# Patient Record
Sex: MALE | Race: WHITE | NOT HISPANIC OR LATINO
[De-identification: names, ages, dates, MRNs, and addresses within clinical notes are randomized per-mention and may not be internally consistent; named-entity substitution may affect disease eponyms.]

---

## 2022-06-01 PROBLEM — Z00.00 ENCOUNTER FOR PREVENTIVE HEALTH EXAMINATION: Status: ACTIVE | Noted: 2022-06-01

## 2022-06-02 ENCOUNTER — APPOINTMENT (OUTPATIENT)
Dept: ORTHOPEDIC SURGERY | Facility: CLINIC | Age: 57
End: 2022-06-02
Payer: COMMERCIAL

## 2022-06-02 VITALS — WEIGHT: 200 LBS | HEIGHT: 74 IN | BODY MASS INDEX: 25.67 KG/M2

## 2022-06-02 DIAGNOSIS — Z86.39 PERSONAL HISTORY OF OTHER ENDOCRINE, NUTRITIONAL AND METABOLIC DISEASE: ICD-10-CM

## 2022-06-02 DIAGNOSIS — Z78.9 OTHER SPECIFIED HEALTH STATUS: ICD-10-CM

## 2022-06-02 PROCEDURE — 73030 X-RAY EXAM OF SHOULDER: CPT | Mod: LT

## 2022-06-02 PROCEDURE — 99204 OFFICE O/P NEW MOD 45 MIN: CPT

## 2022-06-02 RX ORDER — ATORVASTATIN CALCIUM 80 MG/1
TABLET, FILM COATED ORAL
Refills: 0 | Status: ACTIVE | COMMUNITY

## 2022-06-02 RX ORDER — METOPROLOL TARTRATE 75 MG/1
TABLET, FILM COATED ORAL
Refills: 0 | Status: ACTIVE | COMMUNITY

## 2022-06-02 NOTE — DATA REVIEWED
[MRI] : MRI [Outside X-rays] : outside x-rays [Left] : left [Shoulder] : shoulder [Report was reviewed and noted in the chart] : The report was reviewed and noted in the chart [FreeTextEntry1] : IMPRESSION:Severe degenerative changes at the glenohumeral joint as detailed above.tendinosis of the proximal long head of the biceps tendon.

## 2022-06-02 NOTE — ASSESSMENT
[FreeTextEntry1] : Options were discussed. Plan is for patient to get a CT scan or the left shoulder and f/u with Dr. Ingram for further evaluation of the bone depredation in the left GH joint.\par \par Entered by Yrn Hernandez acting as scribe.\par \par Dr. Hutchinson Attestation\par The documentation recorded by the scribe, in my presence, accurately reflects the service I, Dr. Hutchinson, personally performed, and the decisions made by me with my edits as appropriate.\par

## 2022-06-02 NOTE — HISTORY OF PRESENT ILLNESS
[Radiating] : radiating [Sharp] : sharp [Intermittent] : intermittent [de-identified] : LT shoulder pain for 10+ years. NKI. would like to discuss options. Had MRI at  2020 ordered PCP.  Has tried PT on and off for years. Pain has been about the same sometimes its sharp and runs down his bicep, and other times he doesn’t have pain.  rhd. no hx injections. pain in back of shoulder. no waking. pain 2/10 at rest. with use it varies. no meds. [] : no [FreeTextEntry1] : LT shoulder [FreeTextEntry7] : down to bicep

## 2022-06-02 NOTE — PHYSICAL EXAM
[Left] : left shoulder [Degenerative change] : Degenerative change [] : negative Schuler [FreeTextEntry9] : ER 45 B/L, IR Rt. T12 [FreeTextEntry1] : Severe DJD at  joint [TWNoteComboBox7] : active forward flexion 100 degrees [TWNoteComboBox4] : passive forward flexion 85 degrees [TWNoteComboBox6] : internal rotation L3 [de-identified] : external rotation 45 degrees

## 2022-06-08 ENCOUNTER — APPOINTMENT (OUTPATIENT)
Dept: PHYSICAL MEDICINE AND REHAB | Facility: CLINIC | Age: 57
End: 2022-06-08
Payer: COMMERCIAL

## 2022-06-08 VITALS — BODY MASS INDEX: 25.93 KG/M2 | HEIGHT: 74 IN | OXYGEN SATURATION: 97 % | HEART RATE: 90 BPM | WEIGHT: 202 LBS

## 2022-06-08 DIAGNOSIS — I25.10 ATHEROSCLEROTIC HEART DISEASE OF NATIVE CORONARY ARTERY W/OUT ANGINA PECTORIS: ICD-10-CM

## 2022-06-08 DIAGNOSIS — I10 ESSENTIAL (PRIMARY) HYPERTENSION: ICD-10-CM

## 2022-06-08 DIAGNOSIS — Z87.438 PERSONAL HISTORY OF OTHER DISEASES OF MALE GENITAL ORGANS: ICD-10-CM

## 2022-06-08 PROCEDURE — 99205 OFFICE O/P NEW HI 60 MIN: CPT

## 2022-06-08 RX ORDER — DICLOFENAC SODIUM AND MISOPROSTOL 75; 200 MG/1; UG/1
75-0.2 TABLET, DELAYED RELEASE ORAL TWICE DAILY
Qty: 60 | Refills: 0 | Status: ACTIVE | COMMUNITY
Start: 2022-06-08 | End: 1900-01-01

## 2022-06-08 NOTE — PROCEDURE
[de-identified] : Cortisone Injection left subacromial bursa \par \par Sterile Technique Injection \par 1 Syringe of 4cc 1 % Lidocaine HCL, 1cc Dexamethasone \par \par Left subacromial bursa \par \par Ice injection site PRN \par Injection tolerated well.\par \par \par

## 2022-06-08 NOTE — PHYSICAL EXAM
[FreeTextEntry1] : EXAMINATION OF THE LEFT SHOULDER: \par \par Upon inspection,  no deformity\par On palpation, tendernes involving greater trochenteric region. \par Range of motion testing was performed with the use of a goniometer. \par On range of motion testing, active flexion was to 110º\par Active abduction was to 90º\par Active external rotation was to 45º\par Active internal rotation was to 40º\par Active extension was to 20º\par Drop Arm Test was -.  Anterior and Posterior Apprehension Testing was -.  \par Painful arc:  degrees\par There was a + Impingement Sign.  Supraspinatus Test was +.  Yerganson's: -\par MMT: Flexion 5-/5, Abduction 5-/5, all other planes 5/5.\par \par \par \par

## 2022-06-08 NOTE — HISTORY OF PRESENT ILLNESS
[FreeTextEntry1] : Pt is a 56 year old male with complaints of left shoulder pain. Pt reports that he has been experiencing bouts of intermittent pain involving his left shoulder for approximately 10 years.  Over the course of time he has received physical therapy which has provided him some degree of relief in terms of improved range of motion.  He takes Motrin as needed.  He reports experiencing an exacerbation left shoulder pain approximately 2 weeks ago.  Unaware of any precipitating event.  Has been taking Motrin as needed with some relief.  He has been evaluated with  (orthopedist) x-ray was obtained which demonstrated severe DJD of the joint.  He is pending an MRI of his left shoulder.  Presents today in my office for evaluation and treatment of left shoulder pain.

## 2022-06-08 NOTE — ASSESSMENT
[FreeTextEntry1] : Diclofenac po qd BID with meal #60\par Follow-up with ortho \par Instruction in HEP\par Recheck in 4-6 weeks if symptoms persist \par

## 2022-06-09 ENCOUNTER — RESULT REVIEW (OUTPATIENT)
Age: 57
End: 2022-06-09

## 2022-06-09 ENCOUNTER — HOSPITAL ENCOUNTER (INPATIENT)
Age: 57
LOS: 4 days | Discharge: HOME | DRG: 140 | End: 2022-06-13
Attending: EMERGENCY MEDICINE | Admitting: FAMILY MEDICINE
Payer: MEDICAID

## 2022-06-09 ENCOUNTER — APPOINTMENT (OUTPATIENT)
Dept: GENERAL RADIOLOGY | Age: 57
DRG: 140 | End: 2022-06-09
Attending: EMERGENCY MEDICINE
Payer: MEDICAID

## 2022-06-09 DIAGNOSIS — N17.9 AKI (ACUTE KIDNEY INJURY) (HCC): ICD-10-CM

## 2022-06-09 DIAGNOSIS — I95.9 HYPOTENSION, UNSPECIFIED HYPOTENSION TYPE: Primary | ICD-10-CM

## 2022-06-09 LAB
ALBUMIN SERPL-MCNC: 3.9 G/DL (ref 3.4–5)
ALBUMIN/GLOB SERPL: 1.1 {RATIO} (ref 0.8–1.7)
ALP SERPL-CCNC: 122 U/L (ref 45–117)
ALT SERPL-CCNC: 24 U/L (ref 16–61)
ANION GAP SERPL CALC-SCNC: 8 MMOL/L (ref 3–18)
AST SERPL-CCNC: 29 U/L (ref 10–38)
BASOPHILS # BLD: 0 K/UL (ref 0–0.1)
BASOPHILS NFR BLD: 0 % (ref 0–2)
BILIRUB SERPL-MCNC: 0.4 MG/DL (ref 0.2–1)
BNP SERPL-MCNC: 279 PG/ML (ref 0–900)
BUN SERPL-MCNC: 57 MG/DL (ref 7–18)
BUN/CREAT SERPL: 20 (ref 12–20)
CALCIUM SERPL-MCNC: 9.4 MG/DL (ref 8.5–10.1)
CHLORIDE SERPL-SCNC: 101 MMOL/L (ref 100–111)
CK SERPL-CCNC: 209 U/L (ref 39–308)
CO2 SERPL-SCNC: 31 MMOL/L (ref 21–32)
COVID-19 RAPID TEST, COVR: NOT DETECTED
CREAT SERPL-MCNC: 2.85 MG/DL (ref 0.6–1.3)
DIFFERENTIAL METHOD BLD: ABNORMAL
EOSINOPHIL # BLD: 0.2 K/UL (ref 0–0.4)
EOSINOPHIL NFR BLD: 1 % (ref 0–5)
ERYTHROCYTE [DISTWIDTH] IN BLOOD BY AUTOMATED COUNT: 13.6 % (ref 11.6–14.5)
GLOBULIN SER CALC-MCNC: 3.5 G/DL (ref 2–4)
GLUCOSE SERPL-MCNC: 105 MG/DL (ref 74–99)
HCT VFR BLD AUTO: 42.5 % (ref 36–48)
HGB BLD-MCNC: 14.3 G/DL (ref 13–16)
IMM GRANULOCYTES # BLD AUTO: 0 K/UL (ref 0–0.04)
IMM GRANULOCYTES NFR BLD AUTO: 0 % (ref 0–0.5)
INR PPP: 0.9 (ref 0.8–1.2)
LACTATE SERPL-SCNC: 1.3 MMOL/L (ref 0.4–2)
LYMPHOCYTES # BLD: 4.6 K/UL (ref 0.9–3.6)
LYMPHOCYTES NFR BLD: 30 % (ref 21–52)
MAGNESIUM SERPL-MCNC: 2.2 MG/DL (ref 1.6–2.6)
MCH RBC QN AUTO: 30.3 PG (ref 24–34)
MCHC RBC AUTO-ENTMCNC: 33.6 G/DL (ref 31–37)
MCV RBC AUTO: 90 FL (ref 78–100)
MONOCYTES # BLD: 1.4 K/UL (ref 0.05–1.2)
MONOCYTES NFR BLD: 9 % (ref 3–10)
NEUTS SEG # BLD: 9.1 K/UL (ref 1.8–8)
NEUTS SEG NFR BLD: 60 % (ref 40–73)
NRBC # BLD: 0 K/UL (ref 0–0.01)
NRBC BLD-RTO: 0 PER 100 WBC
PLATELET # BLD AUTO: 312 K/UL (ref 135–420)
PMV BLD AUTO: 10.1 FL (ref 9.2–11.8)
POTASSIUM SERPL-SCNC: 4 MMOL/L (ref 3.5–5.5)
PROT SERPL-MCNC: 7.4 G/DL (ref 6.4–8.2)
PROTHROMBIN TIME: 13 SEC (ref 11.5–15.2)
RBC # BLD AUTO: 4.72 M/UL (ref 4.35–5.65)
RBC MORPH BLD: ABNORMAL
SODIUM SERPL-SCNC: 140 MMOL/L (ref 136–145)
SOURCE, COVRS: NORMAL
TROPONIN-HIGH SENSITIVITY: 11 NG/L (ref 0–78)
WBC # BLD AUTO: 15.3 K/UL (ref 4.6–13.2)

## 2022-06-09 PROCEDURE — 74011250636 HC RX REV CODE- 250/636: Performed by: EMERGENCY MEDICINE

## 2022-06-09 PROCEDURE — 94640 AIRWAY INHALATION TREATMENT: CPT

## 2022-06-09 PROCEDURE — 96368 THER/DIAG CONCURRENT INF: CPT

## 2022-06-09 PROCEDURE — 74011250636 HC RX REV CODE- 250/636: Performed by: FAMILY MEDICINE

## 2022-06-09 PROCEDURE — 85610 PROTHROMBIN TIME: CPT

## 2022-06-09 PROCEDURE — 65270000029 HC RM PRIVATE

## 2022-06-09 PROCEDURE — 83880 ASSAY OF NATRIURETIC PEPTIDE: CPT

## 2022-06-09 PROCEDURE — 99285 EMERGENCY DEPT VISIT HI MDM: CPT

## 2022-06-09 PROCEDURE — 87635 SARS-COV-2 COVID-19 AMP PRB: CPT

## 2022-06-09 PROCEDURE — 85025 COMPLETE CBC W/AUTO DIFF WBC: CPT

## 2022-06-09 PROCEDURE — 83735 ASSAY OF MAGNESIUM: CPT

## 2022-06-09 PROCEDURE — 93005 ELECTROCARDIOGRAM TRACING: CPT

## 2022-06-09 PROCEDURE — 71045 X-RAY EXAM CHEST 1 VIEW: CPT

## 2022-06-09 PROCEDURE — 83605 ASSAY OF LACTIC ACID: CPT

## 2022-06-09 PROCEDURE — 87040 BLOOD CULTURE FOR BACTERIA: CPT

## 2022-06-09 PROCEDURE — 96375 TX/PRO/DX INJ NEW DRUG ADDON: CPT

## 2022-06-09 PROCEDURE — 84484 ASSAY OF TROPONIN QUANT: CPT

## 2022-06-09 PROCEDURE — 80053 COMPREHEN METABOLIC PANEL: CPT

## 2022-06-09 PROCEDURE — 82550 ASSAY OF CK (CPK): CPT

## 2022-06-09 PROCEDURE — P9047 ALBUMIN (HUMAN), 25%, 50ML: HCPCS | Performed by: FAMILY MEDICINE

## 2022-06-09 PROCEDURE — 74011000250 HC RX REV CODE- 250: Performed by: EMERGENCY MEDICINE

## 2022-06-09 PROCEDURE — 96365 THER/PROPH/DIAG IV INF INIT: CPT

## 2022-06-09 PROCEDURE — 74011000258 HC RX REV CODE- 258: Performed by: EMERGENCY MEDICINE

## 2022-06-09 PROCEDURE — 96361 HYDRATE IV INFUSION ADD-ON: CPT

## 2022-06-09 RX ORDER — ALBUTEROL SULFATE 0.83 MG/ML
2.5 SOLUTION RESPIRATORY (INHALATION)
Status: COMPLETED | OUTPATIENT
Start: 2022-06-09 | End: 2022-06-09

## 2022-06-09 RX ORDER — ACETAMINOPHEN 325 MG/1
650 TABLET ORAL
Status: DISCONTINUED | OUTPATIENT
Start: 2022-06-09 | End: 2022-06-13 | Stop reason: HOSPADM

## 2022-06-09 RX ORDER — DEXAMETHASONE SODIUM PHOSPHATE 4 MG/ML
10 INJECTION, SOLUTION INTRA-ARTICULAR; INTRALESIONAL; INTRAMUSCULAR; INTRAVENOUS; SOFT TISSUE ONCE
Status: COMPLETED | OUTPATIENT
Start: 2022-06-09 | End: 2022-06-09

## 2022-06-09 RX ORDER — ATORVASTATIN CALCIUM 10 MG/1
TABLET, FILM COATED ORAL DAILY
COMMUNITY

## 2022-06-09 RX ORDER — ASPIRIN 81 MG/1
TABLET ORAL DAILY
COMMUNITY

## 2022-06-09 RX ORDER — FAMOTIDINE 20 MG/1
20 TABLET, FILM COATED ORAL 2 TIMES DAILY
Status: DISCONTINUED | OUTPATIENT
Start: 2022-06-10 | End: 2022-06-11

## 2022-06-09 RX ORDER — SODIUM CHLORIDE 0.9 % (FLUSH) 0.9 %
5-40 SYRINGE (ML) INJECTION AS NEEDED
Status: DISCONTINUED | OUTPATIENT
Start: 2022-06-09 | End: 2022-06-13 | Stop reason: HOSPADM

## 2022-06-09 RX ORDER — ALBUMIN HUMAN 250 G/1000ML
25 SOLUTION INTRAVENOUS EVERY 6 HOURS
Status: DISCONTINUED | OUTPATIENT
Start: 2022-06-09 | End: 2022-06-10

## 2022-06-09 RX ORDER — SODIUM CHLORIDE 0.9 % (FLUSH) 0.9 %
5-40 SYRINGE (ML) INJECTION EVERY 8 HOURS
Status: DISCONTINUED | OUTPATIENT
Start: 2022-06-09 | End: 2022-06-13 | Stop reason: HOSPADM

## 2022-06-09 RX ORDER — LOSARTAN POTASSIUM 100 MG/1
TABLET ORAL DAILY
COMMUNITY

## 2022-06-09 RX ORDER — IPRATROPIUM BROMIDE AND ALBUTEROL SULFATE 2.5; .5 MG/3ML; MG/3ML
3 SOLUTION RESPIRATORY (INHALATION)
Status: COMPLETED | OUTPATIENT
Start: 2022-06-09 | End: 2022-06-09

## 2022-06-09 RX ORDER — ONDANSETRON 2 MG/ML
4 INJECTION INTRAMUSCULAR; INTRAVENOUS
Status: DISCONTINUED | OUTPATIENT
Start: 2022-06-09 | End: 2022-06-13 | Stop reason: HOSPADM

## 2022-06-09 RX ORDER — POLYETHYLENE GLYCOL 3350 17 G/17G
17 POWDER, FOR SOLUTION ORAL DAILY PRN
Status: DISCONTINUED | OUTPATIENT
Start: 2022-06-09 | End: 2022-06-13 | Stop reason: HOSPADM

## 2022-06-09 RX ORDER — ONDANSETRON 4 MG/1
4 TABLET, ORALLY DISINTEGRATING ORAL
Status: DISCONTINUED | OUTPATIENT
Start: 2022-06-09 | End: 2022-06-13 | Stop reason: HOSPADM

## 2022-06-09 RX ORDER — ACETAMINOPHEN 650 MG/1
650 SUPPOSITORY RECTAL
Status: DISCONTINUED | OUTPATIENT
Start: 2022-06-09 | End: 2022-06-13 | Stop reason: HOSPADM

## 2022-06-09 RX ORDER — ENOXAPARIN SODIUM 100 MG/ML
30 INJECTION SUBCUTANEOUS DAILY
Status: DISCONTINUED | OUTPATIENT
Start: 2022-06-10 | End: 2022-06-11

## 2022-06-09 RX ADMIN — ALBUTEROL SULFATE 2.5 MG: 2.5 SOLUTION RESPIRATORY (INHALATION) at 19:30

## 2022-06-09 RX ADMIN — AZITHROMYCIN MONOHYDRATE 500 MG: 500 INJECTION, POWDER, LYOPHILIZED, FOR SOLUTION INTRAVENOUS at 22:03

## 2022-06-09 RX ADMIN — DEXAMETHASONE SODIUM PHOSPHATE 10 MG: 4 INJECTION, SOLUTION INTRAMUSCULAR; INTRAVENOUS at 21:04

## 2022-06-09 RX ADMIN — ALBUTEROL SULFATE 2.5 MG: 2.5 SOLUTION RESPIRATORY (INHALATION) at 19:45

## 2022-06-09 RX ADMIN — SODIUM CHLORIDE 1000 ML: 9 INJECTION, SOLUTION INTRAVENOUS at 19:27

## 2022-06-09 RX ADMIN — CEFTRIAXONE 1 G: 1 INJECTION, POWDER, FOR SOLUTION INTRAMUSCULAR; INTRAVENOUS at 22:35

## 2022-06-09 RX ADMIN — SODIUM CHLORIDE 1000 ML: 9 INJECTION, SOLUTION INTRAVENOUS at 21:10

## 2022-06-09 RX ADMIN — IPRATROPIUM BROMIDE AND ALBUTEROL SULFATE 3 ML: .5; 3 SOLUTION RESPIRATORY (INHALATION) at 21:04

## 2022-06-09 RX ADMIN — ALBUMIN (HUMAN) 25 G: 0.25 INJECTION, SOLUTION INTRAVENOUS at 23:36

## 2022-06-09 NOTE — ED TRIAGE NOTES
Pt presents for hypoTN. States he has been taking his BP at home wit 80's/50's. States he is taking medication as prescribed. He is also SOB but is being followed by PCP for this issue and is scheduled for a cath.

## 2022-06-10 ENCOUNTER — APPOINTMENT (OUTPATIENT)
Dept: NON INVASIVE DIAGNOSTICS | Age: 57
DRG: 140 | End: 2022-06-10
Attending: FAMILY MEDICINE
Payer: MEDICAID

## 2022-06-10 PROBLEM — I10 HTN (HYPERTENSION): Status: ACTIVE | Noted: 2022-06-10

## 2022-06-10 PROBLEM — J44.1 COPD WITH ACUTE EXACERBATION (HCC): Status: ACTIVE | Noted: 2022-06-10

## 2022-06-10 PROBLEM — I73.9 PAD (PERIPHERAL ARTERY DISEASE) (HCC): Status: ACTIVE | Noted: 2022-06-10

## 2022-06-10 PROBLEM — F17.200 SMOKER: Status: ACTIVE | Noted: 2022-06-10

## 2022-06-10 LAB
ALBUMIN SERPL-MCNC: 4.2 G/DL (ref 3.4–5)
ALBUMIN/GLOB SERPL: 1.4 {RATIO} (ref 0.8–1.7)
ALP SERPL-CCNC: 99 U/L (ref 45–117)
ALT SERPL-CCNC: 25 U/L (ref 16–61)
ANION GAP SERPL CALC-SCNC: 5 MMOL/L (ref 3–18)
AST SERPL-CCNC: 21 U/L (ref 10–38)
ATRIAL RATE: 73 BPM
BILIRUB SERPL-MCNC: 0.4 MG/DL (ref 0.2–1)
BUN SERPL-MCNC: 41 MG/DL (ref 7–18)
BUN/CREAT SERPL: 24 (ref 12–20)
CALCIUM SERPL-MCNC: 8.5 MG/DL (ref 8.5–10.1)
CALCULATED P AXIS, ECG09: 76 DEGREES
CALCULATED R AXIS, ECG10: 80 DEGREES
CALCULATED T AXIS, ECG11: 68 DEGREES
CHLORIDE SERPL-SCNC: 104 MMOL/L (ref 100–111)
CO2 SERPL-SCNC: 29 MMOL/L (ref 21–32)
CREAT SERPL-MCNC: 1.71 MG/DL (ref 0.6–1.3)
DIAGNOSIS, 93000: NORMAL
ECHO AO ROOT DIAM: 2.9 CM
ECHO AO ROOT INDEX: 1.56 CM/M2
ECHO EST RA PRESSURE: 3 MMHG
ECHO IVC PROX: 1.5 CM
ECHO LV E' LATERAL VELOCITY: 11 CM/S
ECHO LV E' SEPTAL VELOCITY: 10 CM/S
ECHO LV EDV A4C: 53 ML
ECHO LV EDV INDEX A4C: 28 ML/M2
ECHO LV EJECTION FRACTION A4C: 64 %
ECHO LV ESV A4C: 19 ML
ECHO LV ESV INDEX A4C: 10 ML/M2
ECHO LVOT AREA: 3.1 CM2
ECHO LVOT DIAM: 2 CM
ECHO MV A VELOCITY: 0.77 M/S
ECHO MV E DECELERATION TIME (DT): 163.9 MS
ECHO MV E VELOCITY: 0.96 M/S
ECHO MV E/A RATIO: 1.25
ECHO MV E/E' LATERAL: 8.73
ECHO MV E/E' RATIO (AVERAGED): 9.16
ECHO MV E/E' SEPTAL: 9.6
ECHO PULMONARY ARTERY SYSTOLIC PRESSURE (PASP): 44 MMHG
ECHO RIGHT VENTRICULAR SYSTOLIC PRESSURE (RVSP): 44 MMHG
ECHO RV FREE WALL PEAK S': 16 CM/S
ECHO RV INTERNAL DIMENSION: 2.7 CM
ECHO RV TAPSE: 2.7 CM (ref 1.7–?)
ECHO TV REGURGITANT MAX VELOCITY: 3.21 M/S
ECHO TV REGURGITANT PEAK GRADIENT: 41 MMHG
ERYTHROCYTE [DISTWIDTH] IN BLOOD BY AUTOMATED COUNT: 13.5 % (ref 11.6–14.5)
EST. AVERAGE GLUCOSE BLD GHB EST-MCNC: 128 MG/DL
GLOBULIN SER CALC-MCNC: 3 G/DL (ref 2–4)
GLUCOSE BLD STRIP.AUTO-MCNC: 218 MG/DL (ref 70–110)
GLUCOSE BLD STRIP.AUTO-MCNC: 224 MG/DL (ref 70–110)
GLUCOSE BLD STRIP.AUTO-MCNC: 235 MG/DL (ref 70–110)
GLUCOSE BLD STRIP.AUTO-MCNC: 240 MG/DL (ref 70–110)
GLUCOSE SERPL-MCNC: 235 MG/DL (ref 74–99)
HBA1C MFR BLD: 6.1 % (ref 4.2–5.6)
HCT VFR BLD AUTO: 32.8 % (ref 36–48)
HGB BLD-MCNC: 10.9 G/DL (ref 13–16)
MCH RBC QN AUTO: 30.4 PG (ref 24–34)
MCHC RBC AUTO-ENTMCNC: 33.2 G/DL (ref 31–37)
MCV RBC AUTO: 91.6 FL (ref 78–100)
NRBC # BLD: 0 K/UL (ref 0–0.01)
NRBC BLD-RTO: 0 PER 100 WBC
P-R INTERVAL, ECG05: 130 MS
PLATELET # BLD AUTO: 246 K/UL (ref 135–420)
PMV BLD AUTO: 10.1 FL (ref 9.2–11.8)
POTASSIUM SERPL-SCNC: 3.4 MMOL/L (ref 3.5–5.5)
PROT SERPL-MCNC: 7.2 G/DL (ref 6.4–8.2)
Q-T INTERVAL, ECG07: 386 MS
QRS DURATION, ECG06: 72 MS
QTC CALCULATION (BEZET), ECG08: 425 MS
RBC # BLD AUTO: 3.58 M/UL (ref 4.35–5.65)
SODIUM SERPL-SCNC: 138 MMOL/L (ref 136–145)
VENTRICULAR RATE, ECG03: 73 BPM
WBC # BLD AUTO: 8.4 K/UL (ref 4.6–13.2)

## 2022-06-10 PROCEDURE — 83036 HEMOGLOBIN GLYCOSYLATED A1C: CPT

## 2022-06-10 PROCEDURE — 74011000258 HC RX REV CODE- 258: Performed by: HOSPITALIST

## 2022-06-10 PROCEDURE — 85027 COMPLETE CBC AUTOMATED: CPT

## 2022-06-10 PROCEDURE — 65270000029 HC RM PRIVATE

## 2022-06-10 PROCEDURE — P9047 ALBUMIN (HUMAN), 25%, 50ML: HCPCS | Performed by: FAMILY MEDICINE

## 2022-06-10 PROCEDURE — C8929 TTE W OR WO FOL WCON,DOPPLER: HCPCS

## 2022-06-10 PROCEDURE — 74011000250 HC RX REV CODE- 250: Performed by: HOSPITALIST

## 2022-06-10 PROCEDURE — 74011636637 HC RX REV CODE- 636/637: Performed by: FAMILY MEDICINE

## 2022-06-10 PROCEDURE — 94640 AIRWAY INHALATION TREATMENT: CPT

## 2022-06-10 PROCEDURE — 74011250636 HC RX REV CODE- 250/636: Performed by: FAMILY MEDICINE

## 2022-06-10 PROCEDURE — 82962 GLUCOSE BLOOD TEST: CPT

## 2022-06-10 PROCEDURE — 74011250636 HC RX REV CODE- 250/636: Performed by: HOSPITALIST

## 2022-06-10 PROCEDURE — 77010033678 HC OXYGEN DAILY

## 2022-06-10 PROCEDURE — 74011250637 HC RX REV CODE- 250/637: Performed by: FAMILY MEDICINE

## 2022-06-10 PROCEDURE — 80053 COMPREHEN METABOLIC PANEL: CPT

## 2022-06-10 PROCEDURE — 36415 COLL VENOUS BLD VENIPUNCTURE: CPT

## 2022-06-10 PROCEDURE — 74011000250 HC RX REV CODE- 250: Performed by: FAMILY MEDICINE

## 2022-06-10 RX ORDER — IPRATROPIUM BROMIDE AND ALBUTEROL SULFATE 2.5; .5 MG/3ML; MG/3ML
3 SOLUTION RESPIRATORY (INHALATION)
Status: DISCONTINUED | OUTPATIENT
Start: 2022-06-10 | End: 2022-06-13 | Stop reason: HOSPADM

## 2022-06-10 RX ORDER — MAGNESIUM SULFATE 100 %
16 CRYSTALS MISCELLANEOUS AS NEEDED
Status: DISCONTINUED | OUTPATIENT
Start: 2022-06-10 | End: 2022-06-13 | Stop reason: HOSPADM

## 2022-06-10 RX ORDER — ASPIRIN 81 MG/1
81 TABLET ORAL DAILY
Status: DISCONTINUED | OUTPATIENT
Start: 2022-06-10 | End: 2022-06-13 | Stop reason: HOSPADM

## 2022-06-10 RX ORDER — SODIUM CHLORIDE 9 MG/ML
50 INJECTION, SOLUTION INTRAVENOUS CONTINUOUS
Status: DISCONTINUED | OUTPATIENT
Start: 2022-06-10 | End: 2022-06-12

## 2022-06-10 RX ORDER — ATORVASTATIN CALCIUM 20 MG/1
40 TABLET, FILM COATED ORAL
Status: DISCONTINUED | OUTPATIENT
Start: 2022-06-10 | End: 2022-06-13 | Stop reason: HOSPADM

## 2022-06-10 RX ORDER — ATORVASTATIN CALCIUM 10 MG/1
10 TABLET, FILM COATED ORAL
Status: DISCONTINUED | OUTPATIENT
Start: 2022-06-10 | End: 2022-06-10

## 2022-06-10 RX ORDER — DEXTROSE MONOHYDRATE 100 MG/ML
0-250 INJECTION, SOLUTION INTRAVENOUS AS NEEDED
Status: DISCONTINUED | OUTPATIENT
Start: 2022-06-10 | End: 2022-06-13 | Stop reason: HOSPADM

## 2022-06-10 RX ORDER — ARFORMOTEROL TARTRATE 15 UG/2ML
15 SOLUTION RESPIRATORY (INHALATION)
Status: DISCONTINUED | OUTPATIENT
Start: 2022-06-10 | End: 2022-06-13 | Stop reason: HOSPADM

## 2022-06-10 RX ORDER — INSULIN LISPRO 100 [IU]/ML
INJECTION, SOLUTION INTRAVENOUS; SUBCUTANEOUS
Status: DISCONTINUED | OUTPATIENT
Start: 2022-06-10 | End: 2022-06-13 | Stop reason: HOSPADM

## 2022-06-10 RX ORDER — IPRATROPIUM BROMIDE AND ALBUTEROL SULFATE 2.5; .5 MG/3ML; MG/3ML
3 SOLUTION RESPIRATORY (INHALATION)
Status: DISCONTINUED | OUTPATIENT
Start: 2022-06-10 | End: 2022-06-10

## 2022-06-10 RX ORDER — BUDESONIDE 0.5 MG/2ML
500 INHALANT ORAL
Status: DISCONTINUED | OUTPATIENT
Start: 2022-06-10 | End: 2022-06-13

## 2022-06-10 RX ADMIN — SODIUM CHLORIDE, PRESERVATIVE FREE 10 ML: 5 INJECTION INTRAVENOUS at 21:10

## 2022-06-10 RX ADMIN — IPRATROPIUM BROMIDE AND ALBUTEROL SULFATE 3 ML: .5; 3 SOLUTION RESPIRATORY (INHALATION) at 03:42

## 2022-06-10 RX ADMIN — BUDESONIDE 500 MCG: 0.5 INHALANT RESPIRATORY (INHALATION) at 08:23

## 2022-06-10 RX ADMIN — ASPIRIN 81 MG: 81 TABLET, COATED ORAL at 09:59

## 2022-06-10 RX ADMIN — Medication 4 UNITS: at 11:30

## 2022-06-10 RX ADMIN — Medication 4 UNITS: at 07:30

## 2022-06-10 RX ADMIN — IPRATROPIUM BROMIDE AND ALBUTEROL SULFATE 3 ML: .5; 3 SOLUTION RESPIRATORY (INHALATION) at 00:42

## 2022-06-10 RX ADMIN — ALBUMIN (HUMAN) 25 G: 0.25 INJECTION, SOLUTION INTRAVENOUS at 12:35

## 2022-06-10 RX ADMIN — SODIUM CHLORIDE 100 ML/HR: 9 INJECTION, SOLUTION INTRAVENOUS at 12:39

## 2022-06-10 RX ADMIN — METHYLPREDNISOLONE SODIUM SUCCINATE 40 MG: 125 INJECTION, POWDER, FOR SOLUTION INTRAMUSCULAR; INTRAVENOUS at 21:10

## 2022-06-10 RX ADMIN — SODIUM CHLORIDE 100 ML/HR: 9 INJECTION, SOLUTION INTRAVENOUS at 01:13

## 2022-06-10 RX ADMIN — Medication 4 UNITS: at 22:36

## 2022-06-10 RX ADMIN — ARFORMOTEROL TARTRATE 15 MCG: 15 SOLUTION RESPIRATORY (INHALATION) at 22:27

## 2022-06-10 RX ADMIN — METHYLPREDNISOLONE SODIUM SUCCINATE 125 MG: 125 INJECTION, POWDER, FOR SOLUTION INTRAMUSCULAR; INTRAVENOUS at 03:42

## 2022-06-10 RX ADMIN — METHYLPREDNISOLONE SODIUM SUCCINATE 60 MG: 40 INJECTION, POWDER, FOR SOLUTION INTRAMUSCULAR; INTRAVENOUS at 01:12

## 2022-06-10 RX ADMIN — ALBUMIN (HUMAN) 25 G: 0.25 INJECTION, SOLUTION INTRAVENOUS at 05:41

## 2022-06-10 RX ADMIN — CEFTRIAXONE 1 G: 1 INJECTION, POWDER, FOR SOLUTION INTRAMUSCULAR; INTRAVENOUS at 15:00

## 2022-06-10 RX ADMIN — SODIUM CHLORIDE, PRESERVATIVE FREE 1 ML: 5 INJECTION INTRAVENOUS at 09:37

## 2022-06-10 RX ADMIN — BUDESONIDE 500 MCG: 0.5 INHALANT RESPIRATORY (INHALATION) at 22:27

## 2022-06-10 RX ADMIN — FAMOTIDINE 20 MG: 20 TABLET, FILM COATED ORAL at 21:06

## 2022-06-10 RX ADMIN — Medication 4 UNITS: at 16:30

## 2022-06-10 RX ADMIN — ATORVASTATIN CALCIUM 40 MG: 20 TABLET, FILM COATED ORAL at 21:06

## 2022-06-10 RX ADMIN — SODIUM CHLORIDE, PRESERVATIVE FREE 10 ML: 5 INJECTION INTRAVENOUS at 17:28

## 2022-06-10 RX ADMIN — FAMOTIDINE 20 MG: 20 TABLET, FILM COATED ORAL at 09:59

## 2022-06-10 RX ADMIN — IPRATROPIUM BROMIDE AND ALBUTEROL SULFATE 3 ML: .5; 3 SOLUTION RESPIRATORY (INHALATION) at 08:23

## 2022-06-10 RX ADMIN — SODIUM CHLORIDE, PRESERVATIVE FREE 10 ML: 5 INJECTION INTRAVENOUS at 05:42

## 2022-06-10 RX ADMIN — ATORVASTATIN CALCIUM 10 MG: 10 TABLET, FILM COATED ORAL at 01:13

## 2022-06-10 RX ADMIN — AZITHROMYCIN MONOHYDRATE 500 MG: 500 INJECTION, POWDER, LYOPHILIZED, FOR SOLUTION INTRAVENOUS at 21:05

## 2022-06-10 RX ADMIN — ENOXAPARIN SODIUM 30 MG: 100 INJECTION SUBCUTANEOUS at 09:59

## 2022-06-10 RX ADMIN — METHYLPREDNISOLONE SODIUM SUCCINATE 40 MG: 125 INJECTION, POWDER, FOR SOLUTION INTRAMUSCULAR; INTRAVENOUS at 17:26

## 2022-06-10 RX ADMIN — IPRATROPIUM BROMIDE AND ALBUTEROL SULFATE 3 ML: .5; 3 SOLUTION RESPIRATORY (INHALATION) at 11:29

## 2022-06-10 RX ADMIN — ARFORMOTEROL TARTRATE 15 MCG: 15 SOLUTION RESPIRATORY (INHALATION) at 08:23

## 2022-06-10 RX ADMIN — IPRATROPIUM BROMIDE AND ALBUTEROL SULFATE 3 ML: .5; 3 SOLUTION RESPIRATORY (INHALATION) at 22:27

## 2022-06-10 RX ADMIN — SODIUM CHLORIDE, PRESERVATIVE FREE 10 ML: 5 INJECTION INTRAVENOUS at 01:13

## 2022-06-10 NOTE — PROGRESS NOTES
Reason for Admission:   Hypotension [I95.9]  VIVIEN (acute kidney injury) ([facility] [address] [N17.9]    PCP: Lexi Reaves MD    There are currently no Active Isolations  No active infections                RUR Score:     Low, 8%             Resources/supports,as identified by patient/family:   Lives with carlos Cleveland and provides child assistance for grandchildren ages 11, 1 and 2. Barriers to discharge:  May need walk test to determine if needs home O2; currently on 2L because sat dropped per RN and improved with O2 to 99% with application of O2; per patient he frequently experiences SOB with ambulation and has activity intolerance due to hip pain; noted patient has PT eval and will evaluate recommendaitons. Current Advanced Directive/Advance Care Plan:    Advance Care Planning     General Advance Care Planning (ACP) Conversation      Date of Conversation: 6/9/2022  Conducted with:     Healthcare Decision Maker:   No healthcare decision makers have been documented. Click here to complete [address] including selection of the Healthcare Decision Maker Relationship (ie \"Primary\")        Content/Action Overview:     Reviewed DNR/DNI and patient is a full code  Length of Voluntary ACP Conversation in minutes:   5 minutes  Gael Quezada, [address] [place] [address]:       Advance Care Planning 6/10/2022   Confirm Advance Directive None       Click here to complete [address] including selection of the Healthcare Decision Maker Relationship (ie \"Primary\"                                   Plan for utilizing home health:    TBD                          Likelihood of readmission:   HIGH         Transition of Care Plan:                    Initial assessment completed with  Patient at bedside. Cognitive status of patient: alert and oriented x 4. Face sheet information confirmed: yes.   The patient designates carlos [place] to participate in his discharge plan and to receive any needed information. This patient lives in a with house with daughter and 3 grandchildren. Patient able to navigate steps as needed. Prior to hospitalization, patient was considered to be independent with ADLs/IADLS : yes  The daughter will be available to transport patient home upon discharge. The patient already has a cane for medical equipment available in the home. Patient is not currently active with home health. This patient is on dialysis - no     Currently, the discharge plan is evaluate for home O2 needs and possible HH based on PT evaluation. The patient states that he can obtain his medications from the pharmacy, and take his medications as directed.     Patient's current insurance is Payor: [facility] MEDICAID / Plan: [address] [facility] / Product Type: Managed Care Medicaid /        Care Management Interventions  Support Systems: Child(saad)  Discharge Location  Patient Expects to be Discharged to[de-identified] Home

## 2022-06-10 NOTE — ED PROVIDER NOTES
58-year-old male past medical history of diabetes and peripheral vascular disease presents to the emergency department with shortness of breath. Shortness of breath is worse when he leans forward. He has shortness of breath with exertion. No history of COPD but he used to smoke heavily. Patient also has a history of hypertension months ago he increased his blood pressure medication and has no changes since. She has no fevers or chills no COVID concerns. His blood pressure at home and noted it was 80 systolic. Him and his daughter came to the hospital for evaluation. Past Medical History:   Diagnosis Date    Hypertension     PAD (peripheral artery disease) ([facility] [address]        Past Surgical History:   Procedure Laterality Date    HX INTRACRANIAL ANEURYSM REPAIR      HX LITHOTRIPSY           History reviewed. No pertinent family history. Social History     Socioeconomic History    Marital status:      Spouse name: Not on file    Number of children: Not on file    Years of education: Not on file    Highest education level: Not on file   Occupational History    Not on file   Tobacco Use    Smoking status: Current Every Day Smoker    Smokeless tobacco: Not on file   Substance and Sexual Activity    Alcohol use: Not on file    Drug use: Not on file    Sexual activity: Not on file   Other Topics Concern    Not on file   Social History Narrative    Not on file     Social Determinants of Health     Financial Resource Strain:     Difficulty of Paying Living Expenses: Not on file   Food Insecurity:     Worried About Running Out of Food in the Last Year: Not on file    Trell of Food in the Last Year: Not on file   Transportation Needs:     Lack of Transportation (Medical): Not on file    Lack of Transportation (Non-Medical):  Not on file   Physical Activity:     Days of Exercise per Week: Not on file    Minutes of Exercise per Session: Not on file   Stress:     Feeling of Stress : Not on file   Social Connections:     Frequency of Communication with Friends and Family: Not on file    Frequency of Social Gatherings with Friends and Family: Not on file    Attends [employer] Services: Not on file    Active Member of Clubs or Organizations: Not on file    Attends Club or Organization Meetings: Not on file    Marital Status: Not on file   Intimate Partner Violence:     Fear of Current or Ex-Partner: Not on file    Emotionally Abused: Not on file    Physically Abused: Not on file    Sexually Abused: Not on file   Housing Stability:     Unable to Pay for Housing in the Last Year: Not on file    Number of [place] in the Last Year: Not on file    Unstable Housing in the Last Year: Not on file         ALLERGIES: Patient has no known allergies. Review of Systems   Constitutional: Negative. HENT: Negative. Respiratory: Positive for shortness of breath. Cardiovascular: Negative. Gastrointestinal: Negative. Genitourinary: Negative. Musculoskeletal: Negative. Skin: Negative. Neurological: Negative. Hematological: Negative. Psychiatric/Behavioral: Negative. Vitals:    06/09/22 2016 06/09/22 2031 06/09/22 2046 06/09/22 2056   BP: 101/65 (!) 99/59 (!) 104/57 104/61   Pulse: 62 62 61 63   Resp: 20 21 21 17   Temp:       SpO2:       Weight:       Height:                Physical Exam  Vitals and nursing note reviewed. Constitutional:       General: He is not in acute distress. Appearance: Normal appearance. He is not ill-appearing, toxic-appearing or diaphoretic. HENT:      Head: Normocephalic. Nose: Nose normal.   Eyes:      Extraocular Movements: Extraocular movements intact. Neck:      Vascular: No carotid bruit. Cardiovascular:      Rate and Rhythm: Normal rate and regular rhythm. Pulses: Normal pulses. Heart sounds: No murmur heard. No friction rub. No gallop.     Pulmonary:      Effort: Pulmonary effort is normal. No respiratory distress. Breath sounds: No stridor. Wheezing present. No rhonchi or rales. Chest:      Chest wall: No tenderness. Abdominal:      General: There is no distension. Palpations: Abdomen is soft. There is no mass. Tenderness: There is no abdominal tenderness. There is no right CVA tenderness, left CVA tenderness, guarding or rebound. Hernia: No hernia is present. Musculoskeletal:         General: Normal range of motion. Cervical back: Normal range of motion. No rigidity or tenderness. Lymphadenopathy:      Cervical: No cervical adenopathy. Skin:     General: Skin is warm. Capillary Refill: Capillary refill takes less than 2 seconds. Coloration: Skin is not jaundiced or pale. Findings: No bruising, erythema, lesion or rash. Neurological:      General: No focal deficit present. Mental Status: He is oriented to person, place, and time. Cranial Nerves: No cranial nerve deficit. Sensory: No sensory deficit. Motor: No weakness. Coordination: Coordination normal.      Gait: Gait normal.      Deep Tendon Reflexes: Reflexes normal.   Psychiatric:         Mood and Affect: Mood normal.         Behavior: Behavior normal.          MDM  Number of Diagnoses or Management Options  Diagnosis management comments: Has improved blood pressure after getting fluids. Early this year was 1.2. He has a new VIVIEN I do not know why. Patient's physical exam has wheezing he is a smoker given Decadron and breathing treatments. I do not see anything on chest x-ray but he has a leukocytosis and hypotension I will give antibiotics and admit to hospitalist service. Case with Dr. Dat Stockton she will admit.        Amount and/or Complexity of Data Reviewed  Clinical lab tests: reviewed and ordered  Tests in the radiology section of CPT®: ordered and reviewed  Tests in the medicine section of CPT®: reviewed and ordered  Discussion of test results with the performing providers: yes  Decide to obtain previous medical records or to obtain history from someone other than the patient: yes  Review and summarize past medical records: yes  Independent visualization of images, tracings, or specimens: yes    Risk of Complications, Morbidity, and/or Mortality  Presenting problems: moderate  Diagnostic procedures: moderate  Management options: moderate    Patient Progress  Patient progress: stable         Procedures

## 2022-06-10 NOTE — PROGRESS NOTES
Hospitalist Progress Note    Patient: Issac Cooley MRN: [id number]  CSN: [id number]    YOB: 1965  Age: 60 y.o. Sex: male    DOA: 6/9/2022 LOS:  LOS: 1 day          Chief Complaint:    SOB      Assessment/Plan     42-year-old smoker with hypertension and PAD is admitted for acute COPD exacerbation with hypotension and VIVIEN.     Hypotension-improved with IV fluid hydration     VIVIEN-baseline Cr is 1.2  Cr has improved today  IVF  cc/hr  Daily BMP  No ARB  Get U/A     COPD with acute exacerbation without hypoxia  Add rocephin for ac bronchitis  Continue steroids, can wean dose as improved  duonebs PRN Q4H  PT consult     Hypertension  Hold antihypertensives given hypotension on admission  No cozaar with VIVIEN  Can stop albumin as BP improved     PAD-was supposed to get a stent placed in his leg but didn't get the bloodwork done for the procedure  Continue aspirin  lipitor to 40 mg nightly     Smoker  counselled cessation     Full code      Disposition :  Patient Active Problem List   Diagnosis Code    Hypotension I95.9    VIVIEN (acute kidney injury) ([facility] [address] N17.9    HTN (hypertension) I10    PAD [facility]peripheral artery disease) ([facility]) I73.9    Smoker F17.200    [facility] J44.1       Subjective:    feeling better  Less wheezing  No complaints  Denies inc SOB  Prod cough at times  No chest pain    Review of systems:    Constitutional: denies fevers, chills  Cardiovascular: denies chest pain, palpitations  Gastrointestinal: denies nausea, vomiting, diarrhea      Vital signs/Intake and Output:  Visit Vitals  /71   Pulse 82   Temp 97.4 °F (36.3 °C)   Resp 18   Ht 5' 8\" (1.727 m)   Wt 72.6 kg (160 lb)   SpO2 90%   BMI 24.33 kg/m²     Current Shift:  No intake/output data recorded.   Last three shifts:  06/08 1901 - 06/10 0700  In: 2400 [I.V.:2400]  Out: -     Exam:    General: 59 yo WM, alert, NAD, OX3, appears somewhat disshevelled  CVS:Regular rate and rhythm, no M/R/G, S1/S2 heard, no thrill  Lungs:few coarse rhonchi, no active wheezes  Abdomen: Soft, Nontender, No distention, Normal Bowel sounds  Extremities: No C/C/E, pulses palpable 2+  Neuro:grossly normal , follows commands  Psych:appropriate                Labs: Results:       Chemistry Recent Labs     06/10/22  0841 06/09/22 1900   * 105*    140   K 3.4* 4.0    101   CO2 29 31   BUN 41* 57*   CREA 1.71* 2.85*   CA 8.5 9.4   AGAP 5 8   BUCR 24* 20   AP 99 122*   TP 7.2 7.4   ALB 4.2 3.9   GLOB 3.0 3.5   AGRAT 1.4 1.1      CBC w/Diff Recent Labs     06/10/22  0841 06/09/22 1900   WBC 8.4 15.3*   RBC 3.58* 4.72   HGB 10.9* 14.3   HCT 32.8* 42.5    312   GRANS  --  60   LYMPH  --  30   EOS  --  1      Cardiac Enzymes Recent Labs     06/09/22 1900         Coagulation Recent Labs     06/09/22 1900   PTP 13.0   INR 0.9       Lipid Panel No results found for: CHOL, CHOLPOCT, CHOLX, CHLST, CHOLV, 372278, HDL, HDLP, LDL, LDLC, DLDLP, 201324, VLDLC, VLDL, TGLX, TRIGL, TRIGP, TGLPOCT, CHHD, CHHDX   BNP No results for input(s): BNPP in the last 72 hours.    Liver Enzymes Recent Labs     06/10/22  0841   TP 7.2   ALB 4.2   AP 99      Thyroid Studies No results found for: T4, T3U, TSH, TSHEXT, TSHEXT     Procedures/imaging: see electronic medical records for all procedures/Xrays and details which were not copied into this note but were reviewed prior to creation of Dinah Lan MD

## 2022-06-10 NOTE — PROGRESS NOTES
In-Patient CardioPulmonary Rehab Recommendation:    Pt has been identified as possibly benefiting from attending Outpatient Pulmonary Rehabilitation after discharge from hospital.  This recommendation is based on the following diagnosis found in their chart:    COPD with acute exacerbation ([facility] [address]    Pt visited by member of cardiopulmonary rehab staff and given information about the program.  If you feel that patient is appropriate and would benefit, please sent a consult to Cardiopulmonary Rehab prior to discharge.

## 2022-06-10 NOTE — ROUTINE PROCESS
TRANSFER - IN REPORT:    Verbal report received from Kishore Frias RN(name) on Latia Fraire  being received from ED(unit) for routine progression of care      Report consisted of patients Situation, Background, Assessment and   Recommendations(SBAR). Information from the following report(s) SBAR, Kardex, Intake/Output, MAR and Recent Results was reviewed with the receiving nurse. Opportunity for questions and clarification was provided. Assessment completed upon patients arrival to unit and care assumed.

## 2022-06-10 NOTE — H&P
History & Physical    Patient: Willie Amaya MRN: [id number]  CSN: [id number]    YOB: 1965  Age: 60 y.o. Sex: male      DOA: 6/9/2022  Primary Care Provider:  Venkata Carter MD      Assessment/Plan     Patient Active Problem List   Diagnosis Code    Hypotension I95.9    VIVIEN (acute kidney injury) ([facility] [facility] N17.9    HTN (hypertension) I10    [facility] I73.9    Smoker F17.200    COPD with acute exacerbation ([facility] J401     58-year-old smoker with hypertension and PAD is admitted for acute COPD exacerbation with hypotension and VIVIEN. Hypotension-improved with IV fluid hydration  - Albumin infusions  - IV fluid hydration  - Follow-up blood culture  - Hold antihypertensives    VIVIEN-baseline Cr is 1.2, renal function may have worsened after ARB increase  -IV fluid hydration  -Trend creatinine  -Avoid nephrotoxins    COPD with acute exacerbation without hypoxia  - Azithromycin IV  - DuoNebs  - Solu-Medrol with sliding scale insulin  -Pulmicort and Brovana nebs twice daily  --Echocardiogram to evaluate EF and for pulmonary hypertension    Hypertension  -Hold antihypertensives given hypotension on admission    PAD-was supposed to get a stent placed in his leg but didn't get the bloodwork done for the procedure  -Continue aspirin  --Increase lipitor to 40 mg nightly    Smoker  -Nicotine patch declined  - Strongly recommended cessation    Full code    Prophylaxis: Pepcid p.o., Lovenox SQ    Estimated length of stay : 2 nights    Prabhakar Zambrano MD  Nocturnist  ----------------------------------------------------------------------------------------------------------------------------------------------------------------------------  CC: shortness of breath       HPI:     Willie Amaya is a 60 y.o. male with hypertension and PAD who presents to the ED with shortness of breath and hypotension.  He has had several months of dyspnea on exertion, which started around the time his cozaar was increased for HTN. He has seen a cardiologist and had a negative cardiac cath about 2 months ago, shortly after his symptoms initially started. He is on spiriva and albuterol, which he has been taking. He has not had any hypotensive episodes until last night, when his systolic BP was in the 28O. He has not been feeling ill and has been hydrating as usual. He denies fever, chest pain, leg swelling, abdominal pain, nausea, vomiting, and diarrhea. Past Medical History:   Diagnosis Date    Hypertension     PAD (peripheral artery disease) ([facility] [address]        Past Surgical History:   Procedure Laterality Date    HX INTRACRANIAL ANEURYSM REPAIR      HX LITHOTRIPSY         History reviewed. No pertinent family history. Social History     Socioeconomic History    Marital status:    Tobacco Use    Smoking status: Current Every Day Smoker       Prior to Admission medications    Medication Sig Start Date End Date Taking? Authorizing Provider   tiotropium (Spiriva with HandiHaler) 18 mcg inhalation capsule Take 1 Capsule by inhalation daily. Yes Other, MD Stefany   losartan (COZAAR) 100 mg tablet Take  by mouth daily. Yes Other, MD Stefany   atorvastatin (LIPITOR) 10 mg tablet Take  by mouth daily. Yes Other, MD Stefany   aspirin delayed-release 81 mg tablet Take  by mouth daily. Yes Other, MD Stefany       No Known Allergies    Review of Systems  Gen: No fever, chills, malaise, weight loss/gain. Heent: No headache, rhinorrhea, sore throat. Heart: No chest pain, palpitations, +DOMINGUEZ, no pnd, or orthopnea. Resp: No cough, hemoptysis, +wheezing and shortness of breath. GI: No nausea, vomiting, diarrhea, constipation, melena or hematochezia. : No urinary obstruction, dysuria or hematuria. Derm: No rash, new skin lesion or pruritis. Musc/skeletal: no bone or joint complaints. Vasc: No edema, cyanosis or claudication. Endo: No heat/cold intolerance, no polyuria,polydipsia or polyphagia. Neuro: No unilateral weakness, numbness, tingling. No seizures. Heme: No easy bruising or bleeding. Physical Exam:     Physical Exam:  Visit Vitals  /70   Pulse 77   Temp 98 °F (36.7 °C)   Resp 29   Ht 5' 8\" (1.727 m)   Wt 72.6 kg (160 lb)   SpO2 94%   BMI 24.33 kg/m²      O2 Device: None (Room air)    Temp (24hrs), Av °F (36.7 °C), Min:98 °F (36.7 °C), Max:98 °F (36.7 °C)     1901 - 06/10 0700  In: 2400 [I.V.:2400]  Out: -    No intake/output data recorded. General:  Awake, cooperative, no distress. Head:  Normocephalic, without obvious abnormality, atraumatic. Eyes:  Conjunctivae/corneas clear, sclera anicteric. Neck: Supple, symmetrical, trachea midline. Lungs:   Coarse breath sounds and expiratory wheezing throughout all lung fields. Heart:  Regular rate and rhythm, S1, S2 normal, no murmur, click, rub or gallop. Abdomen: Soft, non-tender. Bowel sounds normal. No masses,  No organomegaly. Extremities: Extremities normal, atraumatic, no cyanosis or edema. Capillary refill normal.   Pulses: 2+ and symmetric all extremities. Skin: Skin color pink, turgor normal. No rashes or lesions   Neurologic: No focal motor or sensory deficit. Labs Reviewed: All lab results for the last 24 hours reviewed. Recent Results (from the past 24 hour(s))   CBC WITH AUTOMATED DIFF    Collection Time: 22  7:00 PM   Result Value Ref Range    WBC 15.3 (H) 4.6 - 13.2 K/uL    RBC 4.72 4.35 - 5.65 M/uL    HGB 14.3 13.0 - 16.0 g/dL    HCT 42.5 36.0 - 48.0 %    MCV 90.0 78.0 - 100.0 FL    MCH 30.3 24.0 - 34.0 PG    MCHC 33.6 31.0 - 37.0 g/dL    RDW 13.6 11.6 - 14.5 %    PLATELET 740 272 - 000 K/uL    MPV 10.1 9.2 - 11.8 FL    NRBC 0.0 0  WBC    ABSOLUTE NRBC 0.00 0.00 - 0.01 K/uL    NEUTROPHILS 60 40 - 73 %    LYMPHOCYTES 30 21 - 52 %    MONOCYTES 9 3 - 10 %    EOSINOPHILS 1 0 - 5 %    BASOPHILS 0 0 - 2 %    IMMATURE GRANULOCYTES 0 0.0 - 0.5 %    ABS.  NEUTROPHILS 9.1 (H) 1.8 - 8.0 K/UL    ABS. LYMPHOCYTES 4.6 (H) 0.9 - 3.6 K/UL    ABS. MONOCYTES 1.4 (H) 0.05 - 1.2 K/UL    ABS. EOSINOPHILS 0.2 0.0 - 0.4 K/UL    ABS. BASOPHILS 0.0 0.0 - 0.1 K/UL    ABS. IMM. GRANS. 0.0 0.00 - 0.04 K/UL    DF MANUAL      RBC COMMENTS NORMOCYTIC, NORMOCHROMIC     PROTHROMBIN TIME + INR    Collection Time: 06/09/22  7:00 PM   Result Value Ref Range    Prothrombin time 13.0 11.5 - 15.2 sec    INR 0.9 0.8 - 1.2     METABOLIC PANEL, COMPREHENSIVE    Collection Time: 06/09/22  7:00 PM   Result Value Ref Range    Sodium 140 136 - 145 mmol/L    Potassium 4.0 3.5 - 5.5 mmol/L    Chloride 101 100 - 111 mmol/L    CO2 31 21 - 32 mmol/L    Anion gap 8 3.0 - 18 mmol/L    Glucose 105 (H) 74 - 99 mg/dL    BUN 57 (H) 7.0 - 18 MG/DL    Creatinine 2.85 (H) 0.6 - 1.3 MG/DL    BUN/Creatinine ratio 20 12 - 20      GFR est AA 28 (L) >60 ml/min/1.73m2    GFR est non-AA 23 (L) >60 ml/min/1.73m2    Calcium 9.4 8.5 - 10.1 MG/DL    Bilirubin, total 0.4 0.2 - 1.0 MG/DL    ALT (SGPT) 24 16 - 61 U/L    AST (SGOT) 29 10 - 38 U/L    Alk.  phosphatase 122 (H) 45 - 117 U/L    Protein, total 7.4 6.4 - 8.2 g/dL    Albumin 3.9 3.4 - 5.0 g/dL    Globulin 3.5 2.0 - 4.0 g/dL    A-G Ratio 1.1 0.8 - 1.7     NT-PRO BNP    Collection Time: 06/09/22  7:00 PM   Result Value Ref Range    NT pro- 0 - 900 PG/ML   TROPONIN-HIGH SENSITIVITY    Collection Time: 06/09/22  7:00 PM   Result Value Ref Range    Troponin-High Sensitivity 11 0 - 78 ng/L   MAGNESIUM    Collection Time: 06/09/22  7:00 PM   Result Value Ref Range    Magnesium 2.2 1.6 - 2.6 mg/dL   CK    Collection Time: 06/09/22  7:00 PM   Result Value Ref Range     39 - 308 U/L   EKG, 12 LEAD, INITIAL    Collection Time: 06/09/22  7:00 PM   Result Value Ref Range    Ventricular Rate 73 BPM    Atrial Rate 73 BPM    P-R Interval 130 ms    QRS Duration 72 ms    Q-T Interval 386 ms    QTC Calculation (Bezet) 425 ms    Calculated P Axis 76 degrees    Calculated R Axis 80 degrees Calculated T Axis 68 degrees    Diagnosis       Poor data quality, interpretation may be adversely affected  Normal sinus rhythm  Poor R Wave Progression  Abnormal ECG  Confirmed by Saniya Isbell MD, [id number] on 6/10/2022 12:26:44 AM     LACTIC ACID    Collection Time: 06/09/22  7:30 PM   Result Value Ref Range    Lactic acid 1.3 0.4 - 2.0 MMOL/L   COVID-19 RAPID TEST    Collection Time: 06/09/22  9:51 PM   Result Value Ref Range    Specimen source Nasopharyngeal      COVID-19 rapid test Not detected NOTD       Results  XR CHEST PORT (Accession [id number]) (Order [id number])    Allergies       No Known Allergies     Exam Information    Status Exam Begun  Exam Ended    Final [99] 6/09/2022 19:23 6/09/2022  7:31 PM [id number]  7:31 PM     Result Information    Status: Final result (Exam End: 6/9/2022 19:31) Provider Status: Open       XR CHEST PORT: Patient Communication     Released  Not seen     Study Result    Narrative & Impression   EXAM:  AP Portable Chest X-ray 1 view      INDICATION: Shortness of breath     COMPARISON: None     _______________     FINDINGS:  Heart and mediastinal contours are within normal limits for portable  radiograph. Lungs are clear of active disease. There are no pleural effusions.   No acute osseous findings.     ________________        IMPRESSION  No acute process

## 2022-06-11 ENCOUNTER — APPOINTMENT (OUTPATIENT)
Dept: GENERAL RADIOLOGY | Age: 57
DRG: 140 | End: 2022-06-11
Attending: INTERNAL MEDICINE
Payer: MEDICAID

## 2022-06-11 LAB
ALBUMIN SERPL-MCNC: 4.5 G/DL (ref 3.4–5)
ALBUMIN/GLOB SERPL: 1.4 {RATIO} (ref 0.8–1.7)
ALP SERPL-CCNC: 92 U/L (ref 45–117)
ALT SERPL-CCNC: 33 U/L (ref 16–61)
ANION GAP SERPL CALC-SCNC: 10 MMOL/L (ref 3–18)
AST SERPL-CCNC: 24 U/L (ref 10–38)
BILIRUB SERPL-MCNC: 0.3 MG/DL (ref 0.2–1)
BUN SERPL-MCNC: 28 MG/DL (ref 7–18)
BUN/CREAT SERPL: 17 (ref 12–20)
CALCIUM SERPL-MCNC: 9 MG/DL (ref 8.5–10.1)
CHLORIDE SERPL-SCNC: 109 MMOL/L (ref 100–111)
CO2 SERPL-SCNC: 25 MMOL/L (ref 21–32)
CREAT SERPL-MCNC: 1.62 MG/DL (ref 0.6–1.3)
ERYTHROCYTE [DISTWIDTH] IN BLOOD BY AUTOMATED COUNT: 13.7 % (ref 11.6–14.5)
GLOBULIN SER CALC-MCNC: 3.3 G/DL (ref 2–4)
GLUCOSE BLD STRIP.AUTO-MCNC: 123 MG/DL (ref 70–110)
GLUCOSE BLD STRIP.AUTO-MCNC: 145 MG/DL (ref 70–110)
GLUCOSE BLD STRIP.AUTO-MCNC: 160 MG/DL (ref 70–110)
GLUCOSE BLD STRIP.AUTO-MCNC: 162 MG/DL (ref 70–110)
GLUCOSE BLD STRIP.AUTO-MCNC: 181 MG/DL (ref 70–110)
GLUCOSE SERPL-MCNC: 141 MG/DL (ref 74–99)
HCT VFR BLD AUTO: 34.4 % (ref 36–48)
HGB BLD-MCNC: 11.4 G/DL (ref 13–16)
MCH RBC QN AUTO: 30.4 PG (ref 24–34)
MCHC RBC AUTO-ENTMCNC: 33.1 G/DL (ref 31–37)
MCV RBC AUTO: 91.7 FL (ref 78–100)
NRBC # BLD: 0 K/UL (ref 0–0.01)
NRBC BLD-RTO: 0 PER 100 WBC
PLATELET # BLD AUTO: 309 K/UL (ref 135–420)
PMV BLD AUTO: 10.1 FL (ref 9.2–11.8)
POTASSIUM SERPL-SCNC: 3.3 MMOL/L (ref 3.5–5.5)
PROT SERPL-MCNC: 7.8 G/DL (ref 6.4–8.2)
RBC # BLD AUTO: 3.75 M/UL (ref 4.35–5.65)
SODIUM SERPL-SCNC: 144 MMOL/L (ref 136–145)
WBC # BLD AUTO: 22.9 K/UL (ref 4.6–13.2)

## 2022-06-11 PROCEDURE — 74011000250 HC RX REV CODE- 250: Performed by: FAMILY MEDICINE

## 2022-06-11 PROCEDURE — 71046 X-RAY EXAM CHEST 2 VIEWS: CPT

## 2022-06-11 PROCEDURE — 74011250636 HC RX REV CODE- 250/636: Performed by: HOSPITALIST

## 2022-06-11 PROCEDURE — 77010033678 HC OXYGEN DAILY

## 2022-06-11 PROCEDURE — 74011636637 HC RX REV CODE- 636/637: Performed by: FAMILY MEDICINE

## 2022-06-11 PROCEDURE — 80053 COMPREHEN METABOLIC PANEL: CPT

## 2022-06-11 PROCEDURE — 94640 AIRWAY INHALATION TREATMENT: CPT

## 2022-06-11 PROCEDURE — 82962 GLUCOSE BLOOD TEST: CPT

## 2022-06-11 PROCEDURE — 74011250636 HC RX REV CODE- 250/636: Performed by: INTERNAL MEDICINE

## 2022-06-11 PROCEDURE — 74011000250 HC RX REV CODE- 250: Performed by: HOSPITALIST

## 2022-06-11 PROCEDURE — 74011250636 HC RX REV CODE- 250/636: Performed by: FAMILY MEDICINE

## 2022-06-11 PROCEDURE — 97116 GAIT TRAINING THERAPY: CPT

## 2022-06-11 PROCEDURE — 97161 PT EVAL LOW COMPLEX 20 MIN: CPT

## 2022-06-11 PROCEDURE — 74011000258 HC RX REV CODE- 258: Performed by: HOSPITALIST

## 2022-06-11 PROCEDURE — 74011250637 HC RX REV CODE- 250/637: Performed by: FAMILY MEDICINE

## 2022-06-11 PROCEDURE — 65270000029 HC RM PRIVATE

## 2022-06-11 PROCEDURE — 36415 COLL VENOUS BLD VENIPUNCTURE: CPT

## 2022-06-11 PROCEDURE — 85027 COMPLETE CBC AUTOMATED: CPT

## 2022-06-11 RX ORDER — ENOXAPARIN SODIUM 100 MG/ML
40 INJECTION SUBCUTANEOUS DAILY
Status: DISCONTINUED | OUTPATIENT
Start: 2022-06-12 | End: 2022-06-13 | Stop reason: HOSPADM

## 2022-06-11 RX ORDER — FAMOTIDINE 20 MG/1
20 TABLET, FILM COATED ORAL DAILY
Status: DISCONTINUED | OUTPATIENT
Start: 2022-06-12 | End: 2022-06-12

## 2022-06-11 RX ADMIN — ARFORMOTEROL TARTRATE 15 MCG: 15 SOLUTION RESPIRATORY (INHALATION) at 21:03

## 2022-06-11 RX ADMIN — Medication 2 UNITS: at 08:49

## 2022-06-11 RX ADMIN — ACETAMINOPHEN 650 MG: 325 TABLET ORAL at 02:42

## 2022-06-11 RX ADMIN — BUDESONIDE 500 MCG: 0.5 INHALANT RESPIRATORY (INHALATION) at 21:03

## 2022-06-11 RX ADMIN — METHYLPREDNISOLONE SODIUM SUCCINATE 40 MG: 125 INJECTION, POWDER, FOR SOLUTION INTRAMUSCULAR; INTRAVENOUS at 06:22

## 2022-06-11 RX ADMIN — Medication 2 UNITS: at 11:50

## 2022-06-11 RX ADMIN — ATORVASTATIN CALCIUM 40 MG: 20 TABLET, FILM COATED ORAL at 21:04

## 2022-06-11 RX ADMIN — Medication 2 UNITS: at 16:50

## 2022-06-11 RX ADMIN — METHYLPREDNISOLONE SODIUM SUCCINATE 40 MG: 125 INJECTION, POWDER, FOR SOLUTION INTRAMUSCULAR; INTRAVENOUS at 21:04

## 2022-06-11 RX ADMIN — SODIUM CHLORIDE, PRESERVATIVE FREE 10 ML: 5 INJECTION INTRAVENOUS at 06:23

## 2022-06-11 RX ADMIN — IPRATROPIUM BROMIDE AND ALBUTEROL SULFATE 3 ML: .5; 3 SOLUTION RESPIRATORY (INHALATION) at 08:10

## 2022-06-11 RX ADMIN — FAMOTIDINE 20 MG: 20 TABLET, FILM COATED ORAL at 08:50

## 2022-06-11 RX ADMIN — ARFORMOTEROL TARTRATE 15 MCG: 15 SOLUTION RESPIRATORY (INHALATION) at 08:10

## 2022-06-11 RX ADMIN — SODIUM CHLORIDE, PRESERVATIVE FREE 10 ML: 5 INJECTION INTRAVENOUS at 21:11

## 2022-06-11 RX ADMIN — AZITHROMYCIN MONOHYDRATE 500 MG: 500 INJECTION, POWDER, LYOPHILIZED, FOR SOLUTION INTRAVENOUS at 21:05

## 2022-06-11 RX ADMIN — IPRATROPIUM BROMIDE AND ALBUTEROL SULFATE 3 ML: .5; 3 SOLUTION RESPIRATORY (INHALATION) at 02:18

## 2022-06-11 RX ADMIN — CEFTRIAXONE 1 G: 1 INJECTION, POWDER, FOR SOLUTION INTRAMUSCULAR; INTRAVENOUS at 13:02

## 2022-06-11 RX ADMIN — ENOXAPARIN SODIUM 30 MG: 100 INJECTION SUBCUTANEOUS at 08:50

## 2022-06-11 RX ADMIN — ASPIRIN 81 MG: 81 TABLET, COATED ORAL at 08:50

## 2022-06-11 RX ADMIN — BUDESONIDE 500 MCG: 0.5 INHALANT RESPIRATORY (INHALATION) at 08:10

## 2022-06-11 RX ADMIN — SODIUM CHLORIDE 50 ML/HR: 9 INJECTION, SOLUTION INTRAVENOUS at 16:53

## 2022-06-11 RX ADMIN — SODIUM CHLORIDE, PRESERVATIVE FREE 10 ML: 5 INJECTION INTRAVENOUS at 16:51

## 2022-06-11 NOTE — PROGRESS NOTES
Pharmacist Review and Automatic Dose Adjustment of Prophylactic Enoxaparin    *Review reason for admission/hospital problem list*    The reviewing pharmacist has made an adjustment to the ordered enoxaparin dose or converted to UFH per the approved [facility] protocol and table as identified below. Bozena Liu is a 60 y.o. male. No lab exists for component: CREATININE    Estimated Creatinine Clearance: 48.1 mL/min (A) (based on SCr of 1.62 mg/dL (H)).     Height:   Ht Readings from Last 1 Encounters:   06/10/22 172.7 cm (68\")     Weight:  Wt Readings from Last 1 Encounters:   06/11/22 72.5 kg (159 lb 12.8 oz)               Plan: Based upon the patient's weight and renal function, the ordered enoxaparin dose of lovenox 30 mg SQ daily has been changed/converted to Lovenox 40 mg SQ daily      Thank you,  Maldonado Paris, [facility]

## 2022-06-11 NOTE — PROGRESS NOTES
Hospitalist Progress Note    Patient: Brett Alarcon MRN: [id number]  CSN: [id number]    YOB: 1965  Age: 60 y.o. Sex: male    DOA: 6/9/2022 LOS:  LOS: 2 days            Patient Active Problem List   Diagnosis Code    Hypotension I95.9    VIVIEN (acute kidney injury) ([address] N17.9    HTN (hypertension) I10    PAD (peripheral artery disease) ([address] I73.9    Smoker F17.200    COPD with acute exacerbation ([address] J44.1        IMPRESSION and Plan:    Brett Alarcon is a 60 y.o. male with   Patient Active Problem List    Diagnosis Date Noted    HTN (hypertension) 06/10/2022    PAD (peripheral artery disease) ([address] 06/10/2022    Smoker 06/10/2022    COPD with acute exacerbation ([address] 06/10/2022    Hypotension 06/09/2022    VIVIEN (acute kidney injury) ([address] 06/09/2022     Principal Problem:    Hypotension (6/9/2022)    Active Problems:    VIVIEN (acute kidney injury) ([address] (6/9/2022)      HTN (hypertension) (6/10/2022)      PAD (peripheral artery disease) ([address] (6/10/2022)      Smoker (6/10/2022)      COPD with acute exacerbation ([address] (6/10/2022)          73-year-old smoker with hypertension and PAD is admitted for acute COPD exacerbation with hypotension and VIVIEN.     Hypotension-improved with IV fluid hydration     VIVIEN-baseline Cr is 1.2 -- cont to monotr        COPD with acute exacerbation without hypoxia  Add rocephin for ac bronchitis  Decrease steroids  duonebs PRN Q4H      Wbc is elevated--? Due to steroids. Repeat cxr.      Hypertension --bp/hr stable        PAD-was supposed to get a stent placed in his leg but didn't get the bloodwork done for the procedure  Continue aspirin  lipitor to 40 mg nightly     Smoker  counselled cessation    Patient's condition is fair        Recommend to continue hospitalization. Discussed with patient. Chief Complaints:   Chief Complaint   Patient presents with    Hypotension     SUBJECTIVE:  Pt is seen and examined.   Chart reviewed    Feels better with breathing but still sob      Review of systems:    Review of Systems   Constitutional: Positive for malaise/fatigue. HENT: Negative. Eyes: Negative. Respiratory: Positive for cough, shortness of breath and wheezing. Cardiovascular: Positive for leg swelling. Negative for chest pain, palpitations and orthopnea. Gastrointestinal: Negative. Negative for abdominal pain, diarrhea and heartburn. Genitourinary: Negative for dysuria and hematuria. Skin: Negative. Neurological: Positive for weakness. Psychiatric/Behavioral: Negative for depression, substance abuse and suicidal ideas. The patient is not nervous/anxious. PE:  Patient Vitals for the past 24 hrs:   BP Temp Pulse Resp SpO2 Weight   06/11/22 1114 (!) 161/71 97.6 °F (36.4 °C) 85 18 95 % --   06/11/22 0804 (!) 158/80 98.8 °F (37.1 °C) 80 20 92 % --   06/11/22 0341 (!) 150/74 97.4 °F (36.3 °C) 88 18 94 % 72.5 kg (159 lb 12.8 oz)   06/11/22 0218 -- -- -- -- 96 % --   06/11/22 0200 (!) 152/93 97.4 °F (36.3 °C) (!) 105 -- 96 % --   06/10/22 2237 (!) 161/79 97.5 °F (36.4 °C) 94 16 97 % --   06/10/22 2228 -- -- -- -- 92 % --   06/10/22 1937 (!) 156/77 97.5 °F (36.4 °C) 88 18 92 % --   06/10/22 1501 (!) 152/64 97.2 °F (36.2 °C) 88 -- 95 % --     No intake or output data in the 24 hours ending 06/11/22 1118  Patient Vitals for the past 120 hrs:   Weight   06/09/22 1847 72.6 kg (160 lb)   06/09/22 1919 72.6 kg (160 lb)   06/10/22 0901 72.6 kg (160 lb)   06/11/22 0341 72.5 kg (159 lb 12.8 oz)         Physical Exam  Vitals and nursing note reviewed. Constitutional:       General: He is in acute distress. Appearance: He is ill-appearing. Neck:      Vascular: No JVD. Cardiovascular:      Rate and Rhythm: Normal rate and regular rhythm. Heart sounds: Normal heart sounds. Pulmonary:      Effort: Respiratory distress present. Breath sounds: Wheezing present. Abdominal:      General: Bowel sounds are normal. There is no distension. Palpations: Abdomen is soft. Tenderness: There is no abdominal tenderness. There is no rebound. Musculoskeletal:         General: Normal range of motion. Cervical back: Normal range of motion and neck supple. Skin:     General: Skin is warm and dry. Neurological:      Mental Status: He is alert and oriented to person, place, and time. Psychiatric:         Mood and Affect: Affect normal.             Intake and Output:  Current Shift:  No intake/output data recorded.   Last three shifts:  06/09 1901 - 06/11 0700  In: 2400 [I.V.:2400]  Out: -     Lab/Data Reviewed:  Recent Results (from the past 8 hour(s))   GLUCOSE, POC    Collection Time: 06/11/22  8:00 AM   Result Value Ref Range    Glucose (POC) 162 (H) 70 - 110 mg/dL   GLUCOSE, POC    Collection Time: 06/11/22 11:14 AM   Result Value Ref Range    Glucose (POC) 181 (H) 70 - 110 mg/dL     Medications:  Current Facility-Administered Medications   Medication Dose Route Frequency    insulin lispro (HUMALOG) injection   SubCUTAneous AC&HS    glucose chewable tablet 16 g  16 g Oral PRN    glucagon (GLUCAGEN) injection 1 mg  1 mg IntraMUSCular PRN    dextrose 10% infusion 0-250 mL  0-250 mL IntraVENous PRN    0.9% sodium chloride infusion  100 mL/hr IntraVENous CONTINUOUS    aspirin delayed-release tablet 81 mg  81 mg Oral DAILY    budesonide (PULMICORT) 500 mcg/2 ml nebulizer suspension  500 mcg Nebulization BID RT    arformoteroL (BROVANA) neb solution 15 mcg  15 mcg Nebulization BID RT    azithromycin (ZITHROMAX) 500 mg in 0.9% sodium chloride 250 mL (VIAL-MATE)  500 mg IntraVENous Q24H    atorvastatin (LIPITOR) tablet 40 mg  40 mg Oral QHS    albuterol-ipratropium (DUO-NEB) 2.5 MG-0.5 MG/3 ML  3 mL Nebulization Q4H PRN    methylPREDNISolone (PF) (Solu-MEDROL) injection 40 mg  40 mg IntraVENous Q8H    cefTRIAXone (ROCEPHIN) 1 g in 0.9% sodium chloride (MBP/ADV) 50 mL MBP  1 g IntraVENous Q24H    sodium chloride (NS) flush 5-40 mL  5-40 mL IntraVENous Q8H    sodium chloride (NS) flush 5-40 mL  5-40 mL IntraVENous PRN    acetaminophen (TYLENOL) tablet 650 mg  650 mg Oral Q6H PRN    Or    acetaminophen (TYLENOL) suppository 650 mg  650 mg Rectal Q6H PRN    polyethylene glycol (MIRALAX) packet 17 g  17 g Oral DAILY PRN    ondansetron (ZOFRAN ODT) tablet 4 mg  4 mg Oral Q8H PRN    Or    ondansetron (ZOFRAN) injection 4 mg  4 mg IntraVENous Q6H PRN    enoxaparin (LOVENOX) injection 30 mg  30 mg SubCUTAneous DAILY    famotidine (PEPCID) tablet 20 mg  20 mg Oral BID       Recent Results (from the past 24 hour(s))   GLUCOSE, POC    Collection Time: 06/10/22  3:02 PM   Result Value Ref Range    Glucose (POC) 224 (H) 70 - 110 mg/dL   GLUCOSE, POC    Collection Time: 06/10/22  9:35 PM   Result Value Ref Range    Glucose (POC) 218 (H) 70 - 110 mg/dL   GLUCOSE, POC    Collection Time: 06/11/22  1:59 AM   Result Value Ref Range    Glucose (POC) 145 (H) 70 - 999 mg/dL   METABOLIC PANEL, COMPREHENSIVE    Collection Time: 06/11/22  2:35 AM   Result Value Ref Range    Sodium 144 136 - 145 mmol/L    Potassium 3.3 (L) 3.5 - 5.5 mmol/L    Chloride 109 100 - 111 mmol/L    CO2 25 21 - 32 mmol/L    Anion gap 10 3.0 - 18 mmol/L    Glucose 141 (H) 74 - 99 mg/dL    BUN 28 (H) 7.0 - 18 MG/DL    Creatinine 1.62 (H) 0.6 - 1.3 MG/DL    BUN/Creatinine ratio 17 12 - 20      GFR est AA 53 (L) >60 ml/min/1.73m2    GFR est non-AA 44 (L) >60 ml/min/1.73m2    Calcium 9.0 8.5 - 10.1 MG/DL    Bilirubin, total 0.3 0.2 - 1.0 MG/DL    ALT (SGPT) 33 16 - 61 U/L    AST (SGOT) 24 10 - 38 U/L    Alk.  phosphatase 92 45 - 117 U/L    Protein, total 7.8 6.4 - 8.2 g/dL    Albumin 4.5 3.4 - 5.0 g/dL    Globulin 3.3 2.0 - 4.0 g/dL    A-G Ratio 1.4 0.8 - 1.7     CBC W/O DIFF    Collection Time: 06/11/22  2:35 AM   Result Value Ref Range    WBC 22.9 (H) 4.6 - 13.2 K/uL    RBC 3.75 (L) 4.35 - 5.65 M/uL    HGB 11.4 (L) 13.0 - 16.0 g/dL    HCT 34.4 (L) 36.0 - 48.0 %    MCV 91.7 78.0 - 100.0 FL    MCH 30.4 24.0 - 34.0 PG    MCHC 33.1 31.0 - 37.0 g/dL    RDW 13.7 11.6 - 14.5 %    PLATELET 407 448 - 668 K/uL    MPV 10.1 9.2 - 11.8 FL    NRBC 0.0 0  WBC    ABSOLUTE NRBC 0.00 0.00 - 0.01 K/uL   GLUCOSE, POC    Collection Time: 06/11/22  8:00 AM   Result Value Ref Range    Glucose (POC) 162 (H) 70 - 110 mg/dL   GLUCOSE, POC    Collection Time: 06/11/22 11:14 AM   Result Value Ref Range    Glucose (POC) 181 (H) 70 - 110 mg/dL       Procedures/imaging: see electronic medical records for all procedures/Xrays and details which were not copied into this note but were reviewed prior to creation of Doug Cali MD   6/11/2022, 11:18 AM

## 2022-06-11 NOTE — PROGRESS NOTES
Problem: Mobility Impaired (Adult and Pediatric)  Goal: *Acute Goals and Plan of Care (Insert Text)  Description: Physical Therapy Goals  Initiated 6/11/2022 and to be accomplished within 7 day(s)  1. Patient will move from supine to sit and sit to supine  in bed with independence. 2.  Patient will transfer from bed to chair and chair to bed with modified independence using the least restrictive device. 3.  Patient will perform sit to stand with modified independence. 4.  Patient will ambulate with supervision/set-up for 150 feet with the least restrictive device. 5.  Patient will ascend/descend 3 stairs without handrail(s) with supervision/set-up. Outcome: Progressing Towards Goal  Note:   physical Therapy EVALUATION    Patient: Bozena Liu (24 y.o. male)  Date: 6/11/2022  Primary Diagnosis: Hypotension [I95.9]  VIVIEN (acute kidney injury) ([facility] [address] [N17.9]  Precautions:   Fall    ASSESSMENT :  Based on the objective data described below, the patient presents with lower extremity weakness, decreased gait quality and endurance, and decreased activity tolerance. Pt has anxiety which contributes to SOB. SPO2 95-99% throughout session on O2 via NC. Pt performed supine to sit with supervision, sit to stand with CGA. Patient ambulated 20 feet with RW, GB applied, CGA. SOB noted, limiting ambulation distance. Cues/education on breathing technique. PT tolerated seated LE strengthening exercises. Patient would benefit from skilled inpatient physical therapy to address deficits, progress as tolerated to achieve long term goals and allow safe discharge. Patient will benefit from skilled intervention to address the above impairments.   Patients rehabilitation potential is considered to be Good  Factors which may influence rehabilitation potential include:   []         None noted  []         Mental ability/status  [x]         Medical condition  [x]         Home/family situation and support systems  []         Safety awareness  []         Pain tolerance/management  []         Other:      PLAN :  Recommendations and Planned Interventions:  [x]           Bed Mobility Training             []    Neuromuscular Re-Education  [x]           Transfer Training                   []    Orthotic/Prosthetic Training  [x]           Gait Training                          []    Modalities  [x]           Therapeutic Exercises          []    Edema Management/Control  [x]           Therapeutic Activities            [x]    Patient and Family Training/Education  []           Other (comment):    Frequency/Duration: Patient will be followed by physical therapy 1-2 times per day to address goals. Discharge Recommendations: Home Health Physical Therapy  Further Equipment Recommendations for Discharge: rolling walker versus TBD     SUBJECTIVE:   Patient stated I am doing ok.     OBJECTIVE DATA SUMMARY:     Past Medical History:   Diagnosis Date    Hypertension     PAD (peripheral artery disease) ([facility] [address]      Past Surgical History:   Procedure Laterality Date    HX INTRACRANIAL ANEURYSM REPAIR      HX LITHOTRIPSY       Barriers to Learning/Limitations: None  Compensate with: Visual Cues, Verbal Cues, and Tactile Cues  Prior Level of Function/Home Situation: Independent ambulation without AD  Home Situation  Home Environment: Private residence  # Steps to Enter: 3  Rails to Enter: No  One/Two Story Residence: One story  Living Alone: No  Support Systems: Other Family Member(s),Child(saad)  Patient Expects to be Discharged to[de-identified] Home  Current DME Used/Available at Home: Cane, straight  Critical Behavior:  Psychosocial  Purposeful Interaction: Yes  Pt Identified Daily Priority: Clinical issues (comment)  Noras Process: Establish trust  Caring Interventions: Therapeutic modalities; Reassure  Reassure: Informing  Therapeutic Modalities: Humor; Intentional therapeutic touch  Strength:    Strength: Generally decreased, functional  Tone & Sensation:   Tone: Normal  Range Of Motion:  AROM: Generally decreased, functional  Functional Mobility:  Bed Mobility:   Supine to Sit: Supervision  Transfers:  Sit to Stand: Contact guard assistance  Stand to Sit: Contact guard assistance  Ambulation/Gait Training:  Distance (ft): 20 Feet (ft)  Assistive Device: Gait belt  Ambulation - Level of Assistance: Contact guard assistance   Gait Description (WDL): Exceptions to WDL  Gait Abnormalities: Decreased step clearance  Base of Support: Narrowed   Speed/Leslie: Slow;Shuffled  Step Length: Right shortened;Left shortened  Pain:  Pain Scale 1: Numeric (0 - 10)  Pain Intensity 1: 0  Activity Tolerance:   Good  Please refer to the flowsheet for vital signs taken during this treatment. After treatment:   []         Patient left in no apparent distress sitting up in chair  [x]         Patient left in no apparent distress in bed  [x]         Call bell left within reach  [x]         Nursing notified  []         Caregiver present  []         Bed alarm activated    COMMUNICATION/EDUCATION:   [x]         Fall prevention education was provided and the patient/caregiver indicated understanding. [x]         Patient/family have participated as able in goal setting and plan of care. [x]         Patient/family agree to work toward stated goals and plan of care. []         Patient understands intent and goals of therapy, but is neutral about his/her participation. []         Patient is unable to participate in goal setting and plan of care.     Thank you for this referral.  Keny Solomon   Time Calculation: 23 mins   Eval Complexity: History: MEDIUM  Complexity : 1-2 comorbidities / personal factors will impact the outcome/ POC Exam:LOW Complexity : 1-2 Standardized tests and measures addressing body structure, function, activity limitation and / or participation in recreation  Presentation: LOW Complexity : Stable, uncomplicated  Clinical Decision Making:Low Complexity    Overall Complexity:LOW

## 2022-06-11 NOTE — PROGRESS NOTES
Problem: Falls - Risk of  Goal: *Absence of Falls  Description: Document [place] Fall Risk and appropriate interventions in the flowsheet.   Outcome: Progressing Towards Goal  Note: Fall Risk Interventions:            Medication Interventions: Teach patient to arise slowly,Patient to call before getting OOB                   Problem: Patient Education: Go to Patient Education Activity  Goal: Patient/Family Education  Outcome: Progressing Towards Goal

## 2022-06-11 NOTE — PROGRESS NOTES
RENAL DOSE ADJUSTMENT MADE PER P/T PROTOCOL     PREVIOUS ORDER:  pepcid 20mg po bid     Estimated Creatinine Clearance: 48.1 mL/min (A) (based on SCr of 1.62 mg/dL (H)). Recent Labs     06/11/22  0235 06/10/22  0841 06/10/22  0841 06/09/22  1900 06/09/22  1900   BUN 28*  --  41*  --  57*      < > 246   < > 312   INR  --   --   --   --  0.9    < > = values in this interval not displayed.         NEW RENALLY ADJUSTED ORDER: pepcid 20 mg po daily     JEFF Collier [facility]  6/11/2022 1:58 PM

## 2022-06-12 LAB
ALBUMIN SERPL-MCNC: 3.9 G/DL (ref 3.4–5)
ALBUMIN/GLOB SERPL: 1.4 {RATIO} (ref 0.8–1.7)
ALP SERPL-CCNC: 91 U/L (ref 45–117)
ALT SERPL-CCNC: 46 U/L (ref 16–61)
ANION GAP SERPL CALC-SCNC: 7 MMOL/L (ref 3–18)
APPEARANCE UR: CLEAR
AST SERPL-CCNC: 29 U/L (ref 10–38)
BACTERIA URNS QL MICRO: ABNORMAL /HPF
BASOPHILS # BLD: 0.1 K/UL (ref 0–0.1)
BASOPHILS NFR BLD: 0 % (ref 0–2)
BILIRUB SERPL-MCNC: 0.3 MG/DL (ref 0.2–1)
BILIRUB UR QL: NEGATIVE
BUN SERPL-MCNC: 20 MG/DL (ref 7–18)
BUN/CREAT SERPL: 17 (ref 12–20)
CALCIUM SERPL-MCNC: 8.2 MG/DL (ref 8.5–10.1)
CHLORIDE SERPL-SCNC: 107 MMOL/L (ref 100–111)
CO2 SERPL-SCNC: 31 MMOL/L (ref 21–32)
COLOR UR: YELLOW
CREAT SERPL-MCNC: 1.15 MG/DL (ref 0.6–1.3)
DIFFERENTIAL METHOD BLD: ABNORMAL
EOSINOPHIL # BLD: 0 K/UL (ref 0–0.4)
EOSINOPHIL NFR BLD: 0 % (ref 0–5)
EPITH CASTS URNS QL MICRO: 0 /LPF (ref 0–5)
ERYTHROCYTE [DISTWIDTH] IN BLOOD BY AUTOMATED COUNT: 14 % (ref 11.6–14.5)
GLOBULIN SER CALC-MCNC: 2.7 G/DL (ref 2–4)
GLUCOSE BLD STRIP.AUTO-MCNC: 114 MG/DL (ref 70–110)
GLUCOSE BLD STRIP.AUTO-MCNC: 124 MG/DL (ref 70–110)
GLUCOSE BLD STRIP.AUTO-MCNC: 128 MG/DL (ref 70–110)
GLUCOSE BLD STRIP.AUTO-MCNC: 178 MG/DL (ref 70–110)
GLUCOSE SERPL-MCNC: 140 MG/DL (ref 74–99)
GLUCOSE UR STRIP.AUTO-MCNC: 250 MG/DL
HCT VFR BLD AUTO: 33.6 % (ref 36–48)
HGB BLD-MCNC: 11.2 G/DL (ref 13–16)
HGB UR QL STRIP: ABNORMAL
IMM GRANULOCYTES # BLD AUTO: 0.3 K/UL (ref 0–0.04)
IMM GRANULOCYTES NFR BLD AUTO: 1 % (ref 0–0.5)
KETONES UR QL STRIP.AUTO: NEGATIVE MG/DL
LEUKOCYTE ESTERASE UR QL STRIP.AUTO: NEGATIVE
LYMPHOCYTES # BLD: 2 K/UL (ref 0.9–3.6)
LYMPHOCYTES NFR BLD: 8 % (ref 21–52)
MAGNESIUM SERPL-MCNC: 1.5 MG/DL (ref 1.6–2.6)
MCH RBC QN AUTO: 30.4 PG (ref 24–34)
MCHC RBC AUTO-ENTMCNC: 33.3 G/DL (ref 31–37)
MCV RBC AUTO: 91.3 FL (ref 78–100)
MONOCYTES # BLD: 0.9 K/UL (ref 0.05–1.2)
MONOCYTES NFR BLD: 3 % (ref 3–10)
NEUTS SEG # BLD: 22.6 K/UL (ref 1.8–8)
NEUTS SEG NFR BLD: 87 % (ref 40–73)
NITRITE UR QL STRIP.AUTO: NEGATIVE
NRBC # BLD: 0 K/UL (ref 0–0.01)
NRBC BLD-RTO: 0 PER 100 WBC
PH UR STRIP: 7.5 [PH] (ref 5–8)
PHOSPHATE SERPL-MCNC: 2.7 MG/DL (ref 2.5–4.9)
PLATELET # BLD AUTO: 324 K/UL (ref 135–420)
PMV BLD AUTO: 10.3 FL (ref 9.2–11.8)
POTASSIUM SERPL-SCNC: 3.6 MMOL/L (ref 3.5–5.5)
PROT SERPL-MCNC: 6.6 G/DL (ref 6.4–8.2)
PROT UR STRIP-MCNC: 30 MG/DL
RBC # BLD AUTO: 3.68 M/UL (ref 4.35–5.65)
RBC #/AREA URNS HPF: ABNORMAL /HPF (ref 0–5)
SODIUM SERPL-SCNC: 145 MMOL/L (ref 136–145)
SP GR UR REFRACTOMETRY: 1.02 (ref 1–1.03)
UROBILINOGEN UR QL STRIP.AUTO: 0.2 EU/DL (ref 0.2–1)
WBC # BLD AUTO: 25.9 K/UL (ref 4.6–13.2)
WBC URNS QL MICRO: ABNORMAL /HPF (ref 0–5)

## 2022-06-12 PROCEDURE — 74011636637 HC RX REV CODE- 636/637: Performed by: FAMILY MEDICINE

## 2022-06-12 PROCEDURE — 74011250637 HC RX REV CODE- 250/637: Performed by: INTERNAL MEDICINE

## 2022-06-12 PROCEDURE — 84100 ASSAY OF PHOSPHORUS: CPT

## 2022-06-12 PROCEDURE — 82962 GLUCOSE BLOOD TEST: CPT

## 2022-06-12 PROCEDURE — 65270000029 HC RM PRIVATE

## 2022-06-12 PROCEDURE — 94640 AIRWAY INHALATION TREATMENT: CPT

## 2022-06-12 PROCEDURE — 74011250636 HC RX REV CODE- 250/636: Performed by: HOSPITALIST

## 2022-06-12 PROCEDURE — 83735 ASSAY OF MAGNESIUM: CPT

## 2022-06-12 PROCEDURE — 36415 COLL VENOUS BLD VENIPUNCTURE: CPT

## 2022-06-12 PROCEDURE — 74011000258 HC RX REV CODE- 258: Performed by: HOSPITALIST

## 2022-06-12 PROCEDURE — 74011000250 HC RX REV CODE- 250: Performed by: FAMILY MEDICINE

## 2022-06-12 PROCEDURE — 74011250636 HC RX REV CODE- 250/636: Performed by: FAMILY MEDICINE

## 2022-06-12 PROCEDURE — 74011250637 HC RX REV CODE- 250/637: Performed by: FAMILY MEDICINE

## 2022-06-12 PROCEDURE — 81001 URINALYSIS AUTO W/SCOPE: CPT

## 2022-06-12 PROCEDURE — 85025 COMPLETE CBC W/AUTO DIFF WBC: CPT

## 2022-06-12 PROCEDURE — 80053 COMPREHEN METABOLIC PANEL: CPT

## 2022-06-12 PROCEDURE — 74011250636 HC RX REV CODE- 250/636: Performed by: INTERNAL MEDICINE

## 2022-06-12 PROCEDURE — 77010033678 HC OXYGEN DAILY

## 2022-06-12 RX ORDER — FAMOTIDINE 20 MG/1
20 TABLET, FILM COATED ORAL 2 TIMES DAILY
Status: DISCONTINUED | OUTPATIENT
Start: 2022-06-12 | End: 2022-06-13 | Stop reason: HOSPADM

## 2022-06-12 RX ORDER — HYDRALAZINE HYDROCHLORIDE 20 MG/ML
10 INJECTION INTRAMUSCULAR; INTRAVENOUS
Status: DISCONTINUED | OUTPATIENT
Start: 2022-06-12 | End: 2022-06-13 | Stop reason: HOSPADM

## 2022-06-12 RX ORDER — AMLODIPINE BESYLATE 5 MG/1
5 TABLET ORAL DAILY
Status: DISCONTINUED | OUTPATIENT
Start: 2022-06-12 | End: 2022-06-13

## 2022-06-12 RX ADMIN — METHYLPREDNISOLONE SODIUM SUCCINATE 40 MG: 40 INJECTION, POWDER, FOR SOLUTION INTRAMUSCULAR; INTRAVENOUS at 10:25

## 2022-06-12 RX ADMIN — SODIUM CHLORIDE, PRESERVATIVE FREE 10 ML: 5 INJECTION INTRAVENOUS at 22:45

## 2022-06-12 RX ADMIN — ARFORMOTEROL TARTRATE 15 MCG: 15 SOLUTION RESPIRATORY (INHALATION) at 11:13

## 2022-06-12 RX ADMIN — CEFTRIAXONE 1 G: 1 INJECTION, POWDER, FOR SOLUTION INTRAMUSCULAR; INTRAVENOUS at 13:27

## 2022-06-12 RX ADMIN — ENOXAPARIN SODIUM 40 MG: 100 INJECTION SUBCUTANEOUS at 09:46

## 2022-06-12 RX ADMIN — METHYLPREDNISOLONE SODIUM SUCCINATE 20 MG: 40 INJECTION, POWDER, FOR SOLUTION INTRAMUSCULAR; INTRAVENOUS at 22:43

## 2022-06-12 RX ADMIN — AMLODIPINE BESYLATE 5 MG: 5 TABLET ORAL at 13:26

## 2022-06-12 RX ADMIN — AZITHROMYCIN MONOHYDRATE 500 MG: 500 INJECTION, POWDER, LYOPHILIZED, FOR SOLUTION INTRAVENOUS at 22:56

## 2022-06-12 RX ADMIN — ATORVASTATIN CALCIUM 40 MG: 20 TABLET, FILM COATED ORAL at 22:42

## 2022-06-12 RX ADMIN — SODIUM CHLORIDE 50 ML/HR: 9 INJECTION, SOLUTION INTRAVENOUS at 10:28

## 2022-06-12 RX ADMIN — Medication 2 UNITS: at 17:37

## 2022-06-12 RX ADMIN — BUDESONIDE 500 MCG: 0.5 INHALANT RESPIRATORY (INHALATION) at 20:47

## 2022-06-12 RX ADMIN — SODIUM CHLORIDE, PRESERVATIVE FREE 10 ML: 5 INJECTION INTRAVENOUS at 06:07

## 2022-06-12 RX ADMIN — FAMOTIDINE 20 MG: 20 TABLET, FILM COATED ORAL at 22:42

## 2022-06-12 RX ADMIN — FAMOTIDINE 20 MG: 20 TABLET, FILM COATED ORAL at 09:46

## 2022-06-12 RX ADMIN — ASPIRIN 81 MG: 81 TABLET, COATED ORAL at 09:45

## 2022-06-12 RX ADMIN — BUDESONIDE 500 MCG: 0.5 INHALANT RESPIRATORY (INHALATION) at 11:13

## 2022-06-12 RX ADMIN — HYDRALAZINE HYDROCHLORIDE 10 MG: 20 INJECTION INTRAMUSCULAR; INTRAVENOUS at 17:44

## 2022-06-12 RX ADMIN — ARFORMOTEROL TARTRATE 15 MCG: 15 SOLUTION RESPIRATORY (INHALATION) at 20:47

## 2022-06-12 NOTE — PROGRESS NOTES
6100  Bedside shift change report given to [id number] (oncoming nurse) by Jeremy Dorado (offgoing nurse). Report included the following information SBAR, Kardex, Intake/Output and MAR.     1215  Paged Dr. Shilpa Contreras. Patient /82. Patient states he takes BP meds at home but he cannot remember which ones. [facility]  Dr. Shilpa Contreras returned call and was notified. [id number]  Paged Dr. Shilpa Contreras to notify BP is 180/100 and results of walk test. Denies chest pain. Patient maintained O2 sat of 95 with lowest occasional reading of 93 during a 5 min walk test on room air. Patient states he feels ok with out O2, an tolerate mild SOB. 8107  Bedside and verbal shift change report given to [facility] RN by Danyelle Tomas RN. Report included SBAR, kardex, MAR, and recent results.

## 2022-06-12 NOTE — PROGRESS NOTES
Hospitalist Progress Note    Patient: Riley Jones MRN: [id number]  CSN: [id number]    YOB: 1965  Age: 60 y.o. Sex: male    DOA: 6/9/2022 LOS:  LOS: 3 days            Patient Active Problem List   Diagnosis Code    Hypotension I95.9    VIVIEN (acute kidney injury) ([address] N17.9    HTN (hypertension) I10    PAD (peripheral artery disease) ([address] I73.9    Smoker F17.200    COPD with acute exacerbation ([address] J44.1        IMPRESSION and Plan:    Riley Jones is a 60 y.o. male with   Patient Active Problem List    Diagnosis Date Noted    HTN (hypertension) 06/10/2022    PAD (peripheral artery disease) ([address] 06/10/2022    Smoker 06/10/2022    COPD with acute exacerbation ([address] 06/10/2022    Hypotension 06/09/2022    VIVIEN (acute kidney injury) ([address] 06/09/2022     Principal Problem:    Hypotension (6/9/2022)    Active Problems:    VIVIEN (acute kidney injury) ([address] (6/9/2022)      HTN (hypertension) (6/10/2022)      PAD (peripheral artery disease) ([address] (6/10/2022)      Smoker (6/10/2022)      COPD with acute exacerbation ([address] (6/10/2022)          49-year-old smoker with hypertension and PAD is admitted for acute COPD exacerbation with hypotension and VIVIEN.       VIVIEN--- cr is improved            COPD with acute exacerbation without hypoxia  Add rocephin for ac bronchitis  Decrease steroids  duonebs PRN Q4H  Wean off O2      Wbc is elevated--? Due to steroids. Repeat cxr yesterday -- NAD On iv abx     Hypertension --BP is elevated ARB on hold due to elevated cr. Will start norvasc          PAD-was supposed to get a stent placed in his leg but didn't get the bloodwork done for the procedure  Continue aspirin  lipitor to 40 mg nightly     Smoker  counselled cessation    Patient's condition is fair    Encouraged OOB          Recommend to continue hospitalization. Discussed with patient.     Chief Complaints:   Chief Complaint   Patient presents with    Hypotension     SUBJECTIVE:  Pt is seen and examined. Feels better with less sob    BP has been high  Review of systems:    Review of Systems   Constitutional: Positive for malaise/fatigue. HENT: Negative. Eyes: Negative. Respiratory: Positive for cough, shortness of breath and wheezing. Cardiovascular: Negative for chest pain, palpitations, orthopnea and leg swelling. Gastrointestinal: Negative. Negative for abdominal pain, diarrhea and heartburn. Genitourinary: Negative for dysuria and hematuria. Skin: Negative. Neurological: Positive for weakness. Psychiatric/Behavioral: Negative for depression, substance abuse and suicidal ideas. The patient is not nervous/anxious. PE:  Patient Vitals for the past 24 hrs:   BP Temp Pulse Resp SpO2   06/12/22 1235 (!) 169/82 -- -- -- --   06/12/22 1226 (!) 159/93 -- 80 -- --   06/12/22 1114 -- -- -- -- 99 %   06/12/22 1041 (!) 170/86 97.3 °F (36.3 °C) 70 16 99 %   06/12/22 0721 (!) 160/89 97.3 °F (36.3 °C) 69 16 98 %   06/12/22 0310 (!) 155/94 97.7 °F (36.5 °C) 78 18 98 %   06/11/22 2256 (!) 153/87 97.6 °F (36.4 °C) 82 18 98 %   06/11/22 1924 (!) 176/96 97.7 °F (36.5 °C) 85 18 96 %   06/11/22 1523 (!) 154/7 97.6 °F (36.4 °C) 79 18 96 %       Intake/Output Summary (Last 24 hours) at 6/12/2022 1246  Last data filed at 6/12/2022 0906  Gross per 24 hour   Intake 360 ml   Output --   Net 360 ml     Patient Vitals for the past 120 hrs:   Weight   06/09/22 1847 72.6 kg (160 lb)   06/09/22 1919 72.6 kg (160 lb)   06/10/22 0901 72.6 kg (160 lb)   06/11/22 0341 72.5 kg (159 lb 12.8 oz)         Physical Exam  Vitals and nursing note reviewed. Constitutional:       General: He is in acute distress. Appearance: He is ill-appearing. Neck:      Vascular: No JVD. Cardiovascular:      Rate and Rhythm: Normal rate and regular rhythm. Heart sounds: Normal heart sounds. Pulmonary:      Effort: Respiratory distress present. Breath sounds: Wheezing present.    Abdominal:      General: Bowel sounds are normal. There is no distension. Palpations: Abdomen is soft. Tenderness: There is no abdominal tenderness. There is no rebound. Musculoskeletal:         General: Normal range of motion. Cervical back: Normal range of motion and neck supple. Skin:     General: Skin is warm and dry. Neurological:      Mental Status: He is alert and oriented to person, place, and time. Psychiatric:         Mood and Affect: Affect normal.             Intake and Output:  Current Shift:  06/12 0701 - 06/12 1900  In: 360 [P.O.:360]  Out: -   Last three shifts:  No intake/output data recorded.     Lab/Data Reviewed:  Recent Results (from the past 8 hour(s))   GLUCOSE, POC    Collection Time: 06/12/22  7:36 AM   Result Value Ref Range    Glucose (POC) 124 (H) 70 - 110 mg/dL   GLUCOSE, POC    Collection Time: 06/12/22 11:54 AM   Result Value Ref Range    Glucose (POC) 128 (H) 70 - 110 mg/dL     Medications:  Current Facility-Administered Medications   Medication Dose Route Frequency    methylPREDNISolone (PF) (Solu-MEDROL) injection 40 mg  40 mg IntraVENous Q12H    famotidine (PEPCID) tablet 20 mg  20 mg Oral BID    enoxaparin (LOVENOX) injection 40 mg  40 mg SubCUTAneous DAILY    insulin lispro (HUMALOG) injection   SubCUTAneous AC&HS    glucose chewable tablet 16 g  16 g Oral PRN    glucagon (GLUCAGEN) injection 1 mg  1 mg IntraMUSCular PRN    dextrose 10% infusion 0-250 mL  0-250 mL IntraVENous PRN    0.9% sodium chloride infusion  50 mL/hr IntraVENous CONTINUOUS    aspirin delayed-release tablet 81 mg  81 mg Oral DAILY    budesonide (PULMICORT) 500 mcg/2 ml nebulizer suspension  500 mcg Nebulization BID RT    arformoteroL (BROVANA) neb solution 15 mcg  15 mcg Nebulization BID RT    azithromycin (ZITHROMAX) 500 mg in 0.9% sodium chloride 250 mL (VIAL-MATE)  500 mg IntraVENous Q24H    atorvastatin (LIPITOR) tablet 40 mg  40 mg Oral QHS    albuterol-ipratropium (DUO-NEB) 2.5 MG-0.5 MG/3 ML 3 mL Nebulization Q4H PRN    cefTRIAXone (ROCEPHIN) 1 g in 0.9% sodium chloride (MBP/ADV) 50 mL MBP  1 g IntraVENous Q24H    sodium chloride (NS) flush 5-40 mL  5-40 mL IntraVENous Q8H    sodium chloride (NS) flush 5-40 mL  5-40 mL IntraVENous PRN    acetaminophen (TYLENOL) tablet 650 mg  650 mg Oral Q6H PRN    Or    acetaminophen (TYLENOL) suppository 650 mg  650 mg Rectal Q6H PRN    polyethylene glycol (MIRALAX) packet 17 g  17 g Oral DAILY PRN    ondansetron (ZOFRAN ODT) tablet 4 mg  4 mg Oral Q8H PRN    Or    ondansetron (ZOFRAN) injection 4 mg  4 mg IntraVENous Q6H PRN       Recent Results (from the past 24 hour(s))   GLUCOSE, POC    Collection Time: 06/11/22  3:24 PM   Result Value Ref Range    Glucose (POC) 160 (H) 70 - 110 mg/dL   GLUCOSE, POC    Collection Time: 06/11/22  8:58 PM   Result Value Ref Range    Glucose (POC) 123 (H) 70 - 305 mg/dL   METABOLIC PANEL, COMPREHENSIVE    Collection Time: 06/12/22  4:30 AM   Result Value Ref Range    Sodium 145 136 - 145 mmol/L    Potassium 3.6 3.5 - 5.5 mmol/L    Chloride 107 100 - 111 mmol/L    CO2 31 21 - 32 mmol/L    Anion gap 7 3.0 - 18 mmol/L    Glucose 140 (H) 74 - 99 mg/dL    BUN 20 (H) 7.0 - 18 MG/DL    Creatinine 1.15 0.6 - 1.3 MG/DL    BUN/Creatinine ratio 17 12 - 20      GFR est AA >60 >60 ml/min/1.73m2    GFR est non-AA >60 >60 ml/min/1.73m2    Calcium 8.2 (L) 8.5 - 10.1 MG/DL    Bilirubin, total 0.3 0.2 - 1.0 MG/DL    ALT (SGPT) 46 16 - 61 U/L    AST (SGOT) 29 10 - 38 U/L    Alk.  phosphatase 91 45 - 117 U/L    Protein, total 6.6 6.4 - 8.2 g/dL    Albumin 3.9 3.4 - 5.0 g/dL    Globulin 2.7 2.0 - 4.0 g/dL    A-G Ratio 1.4 0.8 - 1.7     CBC WITH AUTOMATED DIFF    Collection Time: 06/12/22  4:30 AM   Result Value Ref Range    WBC 25.9 (H) 4.6 - 13.2 K/uL    RBC 3.68 (L) 4.35 - 5.65 M/uL    HGB 11.2 (L) 13.0 - 16.0 g/dL    HCT 33.6 (L) 36.0 - 48.0 %    MCV 91.3 78.0 - 100.0 FL    MCH 30.4 24.0 - 34.0 PG    MCHC 33.3 31.0 - 37.0 g/dL    RDW 14.0 11.6 - 14.5 %    PLATELET 475 204 - 989 K/uL    MPV 10.3 9.2 - 11.8 FL    NRBC 0.0 0  WBC    ABSOLUTE NRBC 0.00 0.00 - 0.01 K/uL    NEUTROPHILS 87 (H) 40 - 73 %    LYMPHOCYTES 8 (L) 21 - 52 %    MONOCYTES 3 3 - 10 %    EOSINOPHILS 0 0 - 5 %    BASOPHILS 0 0 - 2 %    IMMATURE GRANULOCYTES 1 (H) 0.0 - 0.5 %    ABS. NEUTROPHILS 22.6 (H) 1.8 - 8.0 K/UL    ABS. LYMPHOCYTES 2.0 0.9 - 3.6 K/UL    ABS. MONOCYTES 0.9 0.05 - 1.2 K/UL    ABS. EOSINOPHILS 0.0 0.0 - 0.4 K/UL    ABS. BASOPHILS 0.1 0.0 - 0.1 K/UL    ABS. IMM.  GRANS. 0.3 (H) 0.00 - 0.04 K/UL    DF AUTOMATED     MAGNESIUM    Collection Time: 06/12/22  4:30 AM   Result Value Ref Range    Magnesium 1.5 (L) 1.6 - 2.6 mg/dL   PHOSPHORUS    Collection Time: 06/12/22  4:30 AM   Result Value Ref Range    Phosphorus 2.7 2.5 - 4.9 MG/DL   GLUCOSE, POC    Collection Time: 06/12/22  7:36 AM   Result Value Ref Range    Glucose (POC) 124 (H) 70 - 110 mg/dL   GLUCOSE, POC    Collection Time: 06/12/22 11:54 AM   Result Value Ref Range    Glucose (POC) 128 (H) 70 - 110 mg/dL       Procedures/imaging: see electronic medical records for all procedures/Xrays and details which were not copied into this note but were reviewed prior to creation of Madeleine Vernon MD   6/12/2022, 11:18 AM

## 2022-06-12 NOTE — PROGRESS NOTES
CrCl = 67.7 ml/min  Will change pepcid back to 20 mg po bid  RENAL DOSE ADJUSTMENT MADE PER P/T PROTOCOL     PREVIOUS ORDER:  pepcid 20 mg daily     Estimated Creatinine Clearance: 67.7 mL/min (based on SCr of 1.15 mg/dL). Recent Labs     06/12/22  0430 06/11/22  0235 06/11/22  0235 06/10/22  0841 06/10/22  0841 06/09/22  1900 06/09/22  1900   BUN 20*  --  28*  --  41*   < > 57*      < > 309   < > 246   < > 312   INR  --   --   --   --   --   --  0.9    < > = values in this interval not displayed.         NEW RENALLY ADJUSTED ORDER: pepcid 20 mg po bid     Claudeen Nail, [facility]  6/12/2022 11:16 AM

## 2022-06-12 NOTE — PROGRESS NOTES
Pt refused breathing treatment his morning that was scheduled. Pt changed their mind and it was given late. No distress noted.  Pt is on 2L NC

## 2022-06-13 VITALS
HEART RATE: 84 BPM | HEIGHT: 68 IN | DIASTOLIC BLOOD PRESSURE: 95 MMHG | SYSTOLIC BLOOD PRESSURE: 144 MMHG | TEMPERATURE: 97.8 F | RESPIRATION RATE: 18 BRPM | OXYGEN SATURATION: 94 % | WEIGHT: 159.8 LBS | BODY MASS INDEX: 24.22 KG/M2

## 2022-06-13 LAB
ALBUMIN SERPL-MCNC: 3.9 G/DL (ref 3.4–5)
ALBUMIN/GLOB SERPL: 1.4 {RATIO} (ref 0.8–1.7)
ALP SERPL-CCNC: 104 U/L (ref 45–117)
ALT SERPL-CCNC: 84 U/L (ref 16–61)
ANION GAP SERPL CALC-SCNC: 8 MMOL/L (ref 3–18)
AST SERPL-CCNC: 41 U/L (ref 10–38)
BASOPHILS # BLD: 0 K/UL (ref 0–0.1)
BASOPHILS NFR BLD: 0 % (ref 0–2)
BILIRUB SERPL-MCNC: 0.5 MG/DL (ref 0.2–1)
BUN SERPL-MCNC: 21 MG/DL (ref 7–18)
BUN/CREAT SERPL: 20 (ref 12–20)
CALCIUM SERPL-MCNC: 8.2 MG/DL (ref 8.5–10.1)
CHLORIDE SERPL-SCNC: 105 MMOL/L (ref 100–111)
CO2 SERPL-SCNC: 29 MMOL/L (ref 21–32)
CREAT SERPL-MCNC: 1.05 MG/DL (ref 0.6–1.3)
DIFFERENTIAL METHOD BLD: ABNORMAL
EOSINOPHIL # BLD: 0 K/UL (ref 0–0.4)
EOSINOPHIL NFR BLD: 0 % (ref 0–5)
ERYTHROCYTE [DISTWIDTH] IN BLOOD BY AUTOMATED COUNT: 14 % (ref 11.6–14.5)
GLOBULIN SER CALC-MCNC: 2.8 G/DL (ref 2–4)
GLUCOSE BLD STRIP.AUTO-MCNC: 130 MG/DL (ref 70–110)
GLUCOSE BLD STRIP.AUTO-MCNC: 186 MG/DL (ref 70–110)
GLUCOSE SERPL-MCNC: 139 MG/DL (ref 74–99)
HCT VFR BLD AUTO: 37.6 % (ref 36–48)
HGB BLD-MCNC: 12.3 G/DL (ref 13–16)
IMM GRANULOCYTES # BLD AUTO: 0.2 K/UL (ref 0–0.04)
IMM GRANULOCYTES NFR BLD AUTO: 1 % (ref 0–0.5)
LYMPHOCYTES # BLD: 2.1 K/UL (ref 0.9–3.6)
LYMPHOCYTES NFR BLD: 12 % (ref 21–52)
MAGNESIUM SERPL-MCNC: 1.4 MG/DL (ref 1.6–2.6)
MCH RBC QN AUTO: 30.3 PG (ref 24–34)
MCHC RBC AUTO-ENTMCNC: 32.7 G/DL (ref 31–37)
MCV RBC AUTO: 92.6 FL (ref 78–100)
MONOCYTES # BLD: 0.6 K/UL (ref 0.05–1.2)
MONOCYTES NFR BLD: 4 % (ref 3–10)
NEUTS SEG # BLD: 14.3 K/UL (ref 1.8–8)
NEUTS SEG NFR BLD: 83 % (ref 40–73)
NRBC # BLD: 0 K/UL (ref 0–0.01)
NRBC BLD-RTO: 0 PER 100 WBC
PHOSPHATE SERPL-MCNC: 2.3 MG/DL (ref 2.5–4.9)
PLATELET # BLD AUTO: 360 K/UL (ref 135–420)
PMV BLD AUTO: 10.4 FL (ref 9.2–11.8)
POTASSIUM SERPL-SCNC: 3.7 MMOL/L (ref 3.5–5.5)
PROT SERPL-MCNC: 6.7 G/DL (ref 6.4–8.2)
RBC # BLD AUTO: 4.06 M/UL (ref 4.35–5.65)
SODIUM SERPL-SCNC: 142 MMOL/L (ref 136–145)
WBC # BLD AUTO: 17.2 K/UL (ref 4.6–13.2)

## 2022-06-13 PROCEDURE — 83735 ASSAY OF MAGNESIUM: CPT

## 2022-06-13 PROCEDURE — 74011250637 HC RX REV CODE- 250/637: Performed by: FAMILY MEDICINE

## 2022-06-13 PROCEDURE — 36415 COLL VENOUS BLD VENIPUNCTURE: CPT

## 2022-06-13 PROCEDURE — 97165 OT EVAL LOW COMPLEX 30 MIN: CPT

## 2022-06-13 PROCEDURE — 74011250636 HC RX REV CODE- 250/636: Performed by: FAMILY MEDICINE

## 2022-06-13 PROCEDURE — 74011250637 HC RX REV CODE- 250/637: Performed by: HOSPITALIST

## 2022-06-13 PROCEDURE — 80053 COMPREHEN METABOLIC PANEL: CPT

## 2022-06-13 PROCEDURE — 84100 ASSAY OF PHOSPHORUS: CPT

## 2022-06-13 PROCEDURE — 97116 GAIT TRAINING THERAPY: CPT

## 2022-06-13 PROCEDURE — 94640 AIRWAY INHALATION TREATMENT: CPT

## 2022-06-13 PROCEDURE — 74011000250 HC RX REV CODE- 250: Performed by: FAMILY MEDICINE

## 2022-06-13 PROCEDURE — 82962 GLUCOSE BLOOD TEST: CPT

## 2022-06-13 PROCEDURE — 85025 COMPLETE CBC W/AUTO DIFF WBC: CPT

## 2022-06-13 PROCEDURE — 74011636637 HC RX REV CODE- 636/637: Performed by: HOSPITALIST

## 2022-06-13 RX ORDER — PREDNISONE 20 MG/1
20 TABLET ORAL
Qty: 5 TABLET | Refills: 0 | Status: SHIPPED | OUTPATIENT
Start: 2022-06-14

## 2022-06-13 RX ORDER — DOXYCYCLINE 100 MG/1
100 CAPSULE ORAL 2 TIMES DAILY
Qty: 10 CAPSULE | Refills: 0 | Status: SHIPPED | OUTPATIENT
Start: 2022-06-13

## 2022-06-13 RX ORDER — AMLODIPINE BESYLATE 5 MG/1
10 TABLET ORAL DAILY
Status: DISCONTINUED | OUTPATIENT
Start: 2022-06-13 | End: 2022-06-13 | Stop reason: HOSPADM

## 2022-06-13 RX ORDER — PREDNISONE 20 MG/1
20 TABLET ORAL
Status: DISCONTINUED | OUTPATIENT
Start: 2022-06-13 | End: 2022-06-13 | Stop reason: HOSPADM

## 2022-06-13 RX ADMIN — ENOXAPARIN SODIUM 40 MG: 100 INJECTION SUBCUTANEOUS at 08:49

## 2022-06-13 RX ADMIN — LOSARTAN POTASSIUM: 50 TABLET, FILM COATED ORAL at 09:34

## 2022-06-13 RX ADMIN — ARFORMOTEROL TARTRATE 15 MCG: 15 SOLUTION RESPIRATORY (INHALATION) at 07:56

## 2022-06-13 RX ADMIN — ASPIRIN 81 MG: 81 TABLET, COATED ORAL at 08:52

## 2022-06-13 RX ADMIN — PREDNISONE 20 MG: 20 TABLET ORAL at 08:51

## 2022-06-13 RX ADMIN — FAMOTIDINE 20 MG: 20 TABLET, FILM COATED ORAL at 08:52

## 2022-06-13 RX ADMIN — AMLODIPINE BESYLATE 10 MG: 5 TABLET ORAL at 08:51

## 2022-06-13 NOTE — PROGRESS NOTES
CM met with pt at bedside to discuss transition of care. Pt is no longer on O2. Pt has been ambulating in the room independently. Anticipate pt will transition home with physician follow up when medically stable. CM to continue to follow and assist as needed. Pt's daughter will transport pt home upon discharge.     Care Management Interventions  Support Systems: Other Family Member(s),Child(saad)  Discharge Location  Patient Expects to be Discharged to[de-identified] Home

## 2022-06-13 NOTE — PROGRESS NOTES
Interdisciplinary Round Note   Patient Information: Patient Information: Quirino Henning                                      302[birth year]   Reason for Admission: Hypotension [I95.9]  VIVIEN (acute kidney injury) [facility]) [N17.9]   Attending Provider:   Niles French MD   Past Medical History:   Past Medical History:   Diagnosis Date    Hypertension     PAD (peripheral artery disease) [facility])       Hospital day: 4  RRAT Score: Low Risk            6 Total Score    4 Pt. Coverage (Medicare=5 , Medicaid, or Self-Pay=4)    2 Charlson Comorbidity Score (Age + Comorbid Conditions)        Criteria that do not apply:    Has Seen PCP in Last 6 Months (Yes=3, No=0)    . Living with Significant Other. Assisted Living. LTAC. SNF. or   Rehab    Patient Length of Stay (>5 days = 3)    IP Visits Last 12 Months (1-3=4, 4=9, >4=11)      Retired [address]: Other Family Member(s),Child(saad), History of Falls Within Past 3 Months: No, Needs Assistance with Wound Care AND/OR Mgnt of O2, Nebulizer: No, Requires Financial, Physical and/or Educational Assistance With Medications: No, History of Mental Illness: No, Living Alone: No              VITAL SIGNS  Vitals:    06/13/22 0439 06/13/22 0709 06/13/22 0756 06/13/22 1029   BP: (!) 151/95 (!) 160/103  (!) 130/94   Pulse: 76 83  85   Resp: 16 17     Temp:  97.5 °F (36.4 °C)     SpO2: 94% 93% 93% 94%   Weight:       Height:              Lines, Drains, & Airways    Peripheral IV 06/09/22 Right Antecubital-Site Assessment: Clean, dry, & intact      VTE Prophylaxis                                   Intake and Output:   06/11 1901 - 06/13 0700  In: [address] [P.O.:1120; I.V.:600]  Out: -   No intake/output data recorded.     Stool Color: Brown  Stool Appearance: Formed,Soft  Stool Amount: Medium  Stool Source/Status: Rectum Current Diet: ADULT DIET Regular       Abdominal   Last Bowel Movement Date: 06/13/22  Stool Appearance: Formed,Soft  Nutrition  Chewing/Swallowing Problems: No  Difficulty with Secretions: No  Speech Slurred/Thick/Garbled: No     Recent Glucose Results:   Lab Results   Component Value Date/Time     (H) 06/13/2022 04:40 AM    GLUCPOC 130 (H) 06/13/2022 07:12 AM    GLUCPOC 114 (H) 06/12/2022 09:13 PM    GLUCPOC 178 (H) 06/12/2022 04:21 PM        Sepsis Re-Assessment Documentation:     Date: 6/13/2022   Time: 10:47 AM  Lactic Acid level:      Vital Signs  Level of Consciousness: Alert (0)  Temp: 97.5 °F (36.4 °C)  Temp Source: Oral  Pulse (Heart Rate): 85  Heart Rate Source: Monitor  Resp Rate: 17  BP: (!) 130/94  MAP (Monitor): 109  MAP (Calculated): 106  BP 1 Location: Left upper arm  BP 1 Method: Automatic  BP Patient Position: Sitting  MEWS Score: 1    Vitals:    06/13/22 0439 06/13/22 0709 06/13/22 0756 06/13/22 1029   BP: (!) 151/95 (!) 160/103  (!) 130/94   Pulse: 76 83  85   Resp: 16 17     Temp:  97.5 °F (36.4 °C)     SpO2: 94% 93% 93% 94%      Vitals:    06/13/22 0439 06/13/22 0709 06/13/22 0756 06/13/22 1029   BP: (!) 151/95 (!) 160/103  (!) 130/94   Pulse: 76 83  85   Resp: 16 17     Temp:  97.5 °F (36.4 °C)     SpO2: 94% 93% 93% 94%               Activity Level: Activity Level: Up ad ines   Current Immunizations: There is no immunization history on file for this patient. Recommendations:     IV Antibiotics   COVID status: No active infections     Will the patient require COVID testing prior to discharge for placement?: YES      Interdisciplinary team rounds were held on 10:47 AM with the following team members MD, Bedside RN, Nursing Director, floor care manager, care mgmt supervisor,  RN. Plan of care discussed. See clinical pathway and/or care plan for interventions and desired outcomes.

## 2022-06-13 NOTE — PROGRESS NOTES
Problem: Mobility Impaired (Adult and Pediatric)  Goal: *Acute Goals and Plan of Care (Insert Text)  Description: Physical Therapy Goals  Initiated 6/11/2022 and to be accomplished within 7 day(s)  1. Patient will move from supine to sit and sit to supine  in bed with independence. 2.  Patient will transfer from bed to chair and chair to bed with modified independence using the least restrictive device. 3.  Patient will perform sit to stand with modified independence. 4.  Patient will ambulate with supervision/set-up for 150 feet with the least restrictive device. 5.  Patient will ascend/descend 3 stairs without handrail(s) with supervision/set-up. Note:   physical Therapy TREATMENT    Patient: Thomas Katz (73 y.o. male)  Date: 6/13/2022  Diagnosis: Hypotension [I95.9]  VIVIEN (acute kidney injury) ([facility] [address] [N17.9] Hypotension  Precautions: Fall   Chart, physical therapy assessment, plan of care and goals were reviewed. ASSESSMENT:  Progressing well with mobility. Ambulated in hallway without AD, supervision. No LOB or unsteadiness. Progression toward goals:  [x]      Improving appropriately and progressing toward goals  []      Improving slowly and progressing toward goals  []      Not making progress toward goals and plan of care will be adjusted     PLAN:  Patient continues to benefit from skilled intervention to address the above impairments. Continue treatment per established plan of care. Discharge Recommendations:  None  Further Equipment Recommendations for Discharge:  N/A     SUBJECTIVE:   Patient stated I'm going home today .     OBJECTIVE DATA SUMMARY:   Critical Behavior:  Neurologic State: Alert  Orientation Level: Oriented X4  Cognition: Appropriate decision making,Appropriate for age attention/concentration,Appropriate safety awareness,Follows commands  Safety/Judgement: Awareness of environment,Insight into deficits  Functional Mobility Training:  Bed Mobility:  Scooting: Modified independent  Transfers:  Sit to Stand: Modified independent  Stand to Sit: Modified independent  Balance:  Sitting: Intact  Standing: Intact; Without support  Ambulation/Gait Training:  Distance (ft): 200 Feet (ft)  Assistive Device: Gait belt  Ambulation - Level of Assistance: Supervision  Gait Abnormalities: Decreased step clearance  Speed/Leslie: Pace decreased (<100 feet/min)  Activity Tolerance:   Good  Please refer to the flowsheet for vital signs taken during this treatment.   After treatment:   [] Patient left in no apparent distress sitting up in chair  [x] Patient left in no apparent distress in bed  [x] Call bell left within reach  [] Nursing notified  [] Caregiver present  [] Bed alarm activated      Adrián Solomon   Time Calculation: 8 mins

## 2022-06-13 NOTE — PROGRESS NOTES
Problem: Falls - Risk of  Goal: *Absence of Falls  Description: Document Milo Distance Fall Risk and appropriate interventions in the flowsheet. Outcome: Progressing Towards Goal  Note: Fall Risk Interventions:  Mobility Interventions: Patient to call before getting OOB         Medication Interventions: Teach patient to arise slowly,Patient to call before getting OOB    Elimination Interventions: Call light in reach              Problem: Chronic Obstructive Pulmonary Disease (COPD)  Goal: *Oxygen saturation during activity within specified parameters  Outcome: Progressing Towards Goal  Goal: *Absence of hypoxia  Outcome: Progressing Towards Goal  Note: Remains on room air at present. Saturation levels within normal range. Problem: Hypertension  Goal: *Blood pressure within specified parameters  Outcome: Progressing Towards Goal  Note: Blood pressure trending back towards normal limits, no prns given. Goal: *Fluid volume balance  Outcome: Progressing Towards Goal  Note: IV fluids discontinued. Problem: Hypertension  Goal: *Blood pressure within specified parameters  Outcome: Progressing Towards Goal  Note: Blood pressure trending back towards normal limits, no prns given. Goal: *Fluid volume balance  Outcome: Progressing Towards Goal  Note: IV fluids discontinued.

## 2022-06-13 NOTE — PROGRESS NOTES
Problem: Self Care Deficits Care Plan (Adult)  Goal: *Acute Goals and Plan of Care (Insert Text)  Outcome: Resolved/Met  OCCUPATIONAL THERAPY EVALUATION/DISCHARGE    Patient: Deidra Rosario (75 y.o. male)  Date: 6/13/2022  Primary Diagnosis: Hypotension [I95.9]  VIVIEN (acute kidney injury) ([facility] [address] [N17.9]        Precautions: Fall  PLOF: pt independent for ADLs/functional mobility    ASSESSMENT AND RECOMMENDATIONS:  Based on the objective data described below, the patient appears to be functioning at baseline. Pt found seated EOB, reporting pain 0/10, agreeable to therapy. Pt demonstrated ability to don socks/shoes using bending forward method. Pt mod I for STS/bathroom mobility without AD. Pt reports feeling SOB when showering, discussed energy conservation/work simplification techniques and recommend shower chair. Provided opportunity for pt to voice questions on ADL performance when home, pt has no further concerns. Pt left seated EOB, call bell/phone within reach. Education: role of OT in acute care, fall prevention    Skilled Occupational Therapy is not indicated at this time. Discharge Recommendations: home health vs. none  Further Equipment Recommendations for Discharge: N/A      SUBJECTIVE:   Patient stated I feel fine.     OBJECTIVE DATA SUMMARY:     Past Medical History:   Diagnosis Date    Hypertension     PAD (peripheral artery disease) ([facility] [address]      Past Surgical History:   Procedure Laterality Date    HX INTRACRANIAL ANEURYSM REPAIR      HX LITHOTRIPSY       Barriers to Learning/Limitations: yes;  physical  Compensate with: visual, verbal, tactile, kinesthetic cues/model    Home Situation:   Home Situation  Home Environment: Private residence  # Steps to Enter: 3  Rails to Enter: No  One/Two Story Residence: One story  Living Alone: No  Support Systems: Other Family Member(s)  Patient Expects to be Discharged to[de-identified] Home  Current DME Used/Available at Home: Cane, straight  Tub or Shower Type: Tub/Shower combination  []     Right hand dominant   []     Left hand dominant    Cognitive/Behavioral Status:  Neurologic State: Alert  Orientation Level: Oriented X4  Cognition: Appropriate decision making; Appropriate for age attention/concentration; Appropriate safety awareness; Follows commands  Safety/Judgement: Awareness of environment; Insight into deficits    Coordination: BUE  Coordination: Within functional limits  Fine Motor Skills-Upper: Left Intact; Right Intact    Gross Motor Skills-Upper: Left Intact; Right Intact    Balance:  Sitting: Intact  Standing: Intact; Without support    Strength: BUE  Strength: Generally decreased, functional    Tone & Sensation: BUE  Tone: Normal    Range of Motion: BUE  AROM: Within functional limits    Functional Mobility and Transfers for ADLs:  Bed Mobility:  Scooting: Modified independent  Transfers:  Sit to Stand: Modified independent   Bathroom Mobility: Modified independent    ADL Assessment:  Feeding: Independent  Oral Facial Hygiene/Grooming: Independent  Bathing: Modified independent  Upper Body Dressing: Independent  Lower Body Dressing: Independent  Toileting: Independent    ADL Intervention:  Lower Body Dressing Assistance  Dressing Assistance: Modified independent  Socks: Modified independent  Shoes with Cloth Laces: Modified independent  Leg Crossed Method Used: No  Position Performed: Seated edge of bed    Cognitive Retraining  Safety/Judgement: Awareness of environment; Insight into deficits    Pain:  Pain level pre-treatment: 0/10   Pain level post-treatment: 0/10   Pain Intervention(s): Rest, Ice, Repositioning   Response to intervention: Nurse notified, See doc flow sheet    Activity Tolerance:   Fair. Patient able to stand ~5 minute(s). Patient able to complete ADLs with rest breaks. Patient limited by endurance. Patient unsteady. Please refer to the flowsheet for vital signs taken during this treatment.   After treatment:   []  Patient left in no apparent distress sitting up in chair  [x]  Patient left in no apparent distress seated EOB  [x]  Call bell left within reach  [x]  Nursing notified  []  Caregiver present  []  Bed alarm activated    COMMUNICATION/EDUCATION:   [x]      Role of Occupational Therapy in the acute care setting  [x]      Home safety education was provided and the patient/caregiver indicated understanding. [x]      Patient/family have participated as able and agree with findings and recommendations. []      Patient is unable to participate in plan of care at this time. Thank you for this referral.  Paris Maldonado, OTR/L  Time Calculation: 8 mins      Eval Complexity: History: MEDIUM Complexity : Expanded review of history including physical, cognitive and psychosocial  history ; Examination: LOW Complexity : 1-3 performance deficits relating to physical, cognitive , or psychosocial skils that result in activity limitations and / or participation restrictions ;    Decision Making:LOW Complexity : No comorbidities that affect functional and no verbal or physical assistance needed to complete eval tasks

## 2022-06-13 NOTE — DISCHARGE SUMMARY
[address]    Name:  Isabel Bradford  MR#:   [id number]  :  1965  ACCOUNT #:  [de-identified]  ADMIT DATE:  2022  DISCHARGE DATE:  2022      DISCHARGE DIAGNOSES:  1. Acute chronic obstructive pulmonary disease exacerbation, no hypoxia. 2.  Upper respiratory infection with acute bronchitis. 3.  Hypotension, resolved. 4.  Acute kidney injury, resolved. 5.  Chronic obstructive pulmonary disease with continued tobacco use. 6.  Hypertension. 7.  Peripheral arterial disease. HOSPITAL SUMMARY:  This 64-year-old gentleman who lives at home with his family, is a continued smoker. He has COPD and uses Spiriva at home. He came in with worsening congestion, shortness of breath and wheezing. When he was admitted to the hospital, there was evidence for acute renal insufficiency but improved with IV fluid hydration. His antihypertensives were initially held. The patient had no hypoxia, but acute COPD exacerbation. He was started on IV antibiotics, steroids, nebulizer treatments. The patient has had an uneventful hospital course, he has improved nicely. He does not require oxygen. He passed physical therapy this weekend. White count is elevated likely due to the steroids, but is coming down from 25 to 17. There has been antibiotics in place since admission for upper respiratory infection but there is no evidence for pneumonia on x-ray. His echocardiogram came back with EF of 55%-60%. His H and H are 12.3 and 37.6, platelets are normal.  Chemistries within normal limits. Hemoglobin A1c is 6.1%. Blood sugars are well below 200, and he had a blood culture that is negative from admission. The patient today feels much better. Denies any chest pain, worsening shortness of breath, coughing, or fevers. He wants to go home today. I have examined him. He has a few left-sided expiratory wheezes, otherwise his lungs are clear. Cardiac exam, regular rate and rhythm. No murmur, rub or gallop. Abdomen is benign. Lower extremities are without edema. Vital signs are stable. Oxygen level is normal on room air. The patient can discharge home today. I have updated his daughter by phone as well. I have called in prednisone 20 mg daily for 5 days, doxycycline 100 mg twice daily for 5 days and an albuterol inhaler 2 puffs every 4-6 hours as needed, otherwise he will continue his Spiriva, Cozaar, Lipitor and aspirin that he was on at home with no changes. I have advised him and counseled him on tobacco cessation and the dangers of continuing to do so. I referred him back to Dr. Ratna Alicia, his PCP. Please copy this note to her for followup this week, and I have advised his daughter of such. He had to cancel a vascular procedure last week because he did not have a blood work done in advance. He is going to reschedule that so he can get that taken care of as well. Otherwise, he is stable to leave the hospital.  He is going home with family today. Cardiac diet advised on tobacco cessation. Forty minutes discharge time.       Kenia White MD      [id number]  D:  06/13/2022 12:30  T:  06/13/2022 12:56  JOB #:  [id number]  CC:  Jonelle Toscano MD

## 2022-06-13 NOTE — DISCHARGE INSTRUCTIONS
DISCHARGE SUMMARY from Nurse    PATIENT INSTRUCTIONS:    What to do at Home:    If you experience any of the following symptoms dizziness, fast heart beat, shortness of breath, sweating, difficulty urinating, signs of dehydration , please follow up with 911 or primary physician. *  Please give a list of your current medications to your Primary Care Provider. *  Please update this list whenever your medications are discontinued, doses are      changed, or new medications (including over-the-counter products) are added. *  Please carry medication information at all times in case of emergency situations. These are general instructions for a healthy lifestyle:    No smoking/ No tobacco products/ Avoid exposure to second hand smoke  Surgeon General's Warning:  Quitting smoking now greatly reduces serious risk to your health. Obesity, smoking, and sedentary lifestyle greatly increases your risk for illness    A healthy diet, regular physical exercise & weight monitoring are important for maintaining a healthy lifestyle    You may be retaining fluid if you have a history of heart failure or if you experience any of the following symptoms:  Weight gain of 3 pounds or more overnight or 5 pounds in a week, increased swelling in our hands or feet or shortness of breath while lying flat in bed. Please call your doctor as soon as you notice any of these symptoms; do not wait until your next office visit. The discharge information has been reviewed with the patient. The patient verbalized understanding. Discharge medications reviewed with the patient  Patient Education        DASH Diet: Care Instructions  Your Care Instructions     The DASH diet is an eating plan that can help lower your blood pressure. DASH stands for Dietary Approaches to Stop Hypertension. Hypertension is high blood pressure. The DASH diet focuses on eating foods that are high in calcium, potassium, and magnesium.  These nutrients can lower blood pressure. The foods that are highest in these nutrients are fruits, vegetables, low-fat dairy products, nuts, seeds, and legumes. But taking calcium, potassium, and magnesium supplements instead of eating foods that are high in those nutrients does not have the same effect. The DASH diet also includes whole grains, fish, and poultry. The DASH diet is one of several lifestyle changes your doctor may recommend to lower your high blood pressure. Your doctor may also want you to decrease the amount of sodium in your diet. Lowering sodium while following the DASH diet can lower blood pressure even further than just the DASH diet alone. Follow-up care is a key part of your treatment and safety. Be sure to make and go to all appointments, and call your doctor if you are having problems. It's also a good idea to know your test results and keep a list of the medicines you take. How can you care for yourself at home? Following the DASH diet  · Eat 4 to 5 servings of fruit each day. A serving is 1 medium-sized piece of fruit, ½ cup chopped or canned fruit, 1/4 cup dried fruit, or 4 ounces (½ cup) of fruit juice. Choose fruit more often than fruit juice. · Eat 4 to 5 servings of vegetables each day. A serving is 1 cup of lettuce or raw leafy vegetables, ½ cup of chopped or cooked vegetables, or 4 ounces (½ cup) of vegetable juice. Choose vegetables more often than vegetable juice. · Get 2 to 3 servings of low-fat and fat-free dairy each day. A serving is 8 ounces of milk, 1 cup of yogurt, or 1 ½ ounces of cheese. · Eat 6 to 8 servings of grains each day. A serving is 1 slice of bread, 1 ounce of dry cereal, or ½ cup of cooked rice, pasta, or cooked cereal. Try to choose whole-grain products as much as possible. · Limit lean meat, poultry, and fish to 2 servings each day. A serving is 3 ounces, about the size of a deck of cards.   · Eat 4 to 5 servings of nuts, seeds, and legumes (cooked dried beans, lentils, and split peas) each week. A serving is 1/3 cup of nuts, 2 tablespoons of seeds, or ½ cup of cooked beans or peas. · Limit fats and oils to 2 to 3 servings each day. A serving is 1 teaspoon of vegetable oil or 2 tablespoons of salad dressing. · Limit sweets and added sugars to 5 servings or less a week. A serving is 1 tablespoon jelly or jam, ½ cup sorbet, or 1 cup of lemonade. · Eat less than 2,300 milligrams (mg) of sodium a day. If you limit your sodium to 1,500 mg a day, you can lower your blood pressure even more. · Be aware that all of these are the suggested number of servings for people who eat 1,800 to 2,000 calories a day. Your recommended number of servings may be different if you need more or fewer calories. Tips for success  · Start small. Do not try to make dramatic changes to your diet all at once. You might feel that you are missing out on your favorite foods and then be more likely to not follow the plan. Make small changes, and stick with them. Once those changes become habit, add a few more changes. · Try some of the following:  ? Make it a goal to eat a fruit or vegetable at every meal and at snacks. This will make it easy to get the recommended amount of fruits and vegetables each day. ? Try yogurt topped with fruit and nuts for a snack or healthy dessert. ? Add lettuce, tomato, cucumber, and onion to sandwiches. ? Combine a ready-made pizza crust with low-fat mozzarella cheese and lots of vegetable toppings. Try using tomatoes, squash, spinach, broccoli, carrots, cauliflower, and onions. ? Have a variety of cut-up vegetables with a low-fat dip as an appetizer instead of chips and dip. ? Sprinkle sunflower seeds or chopped almonds over salads. Or try adding chopped walnuts or almonds to cooked vegetables. ? Try some vegetarian meals using beans and peas. Add garbanzo or kidney beans to salads. Make burritos and tacos with mashed esparza beans or black beans.   Where can you learn more? Go to http://www.Coolture.com/  Enter H967 in the search box to learn more about \"DASH Diet: Care Instructions. \"  Current as of: January 10, 2022               Content Version: 13.2  © 4303-4518 [employer]. Care instructions adapted under license by [employer] (which disclaims liability or warranty for this information). If you have questions about a medical condition or this instruction, always ask your healthcare professional. [address] any warranty or liability for your use of this information. Patient Education        Learning About Benefits From Quitting Smoking  How does quitting smoking make you healthier? If you're thinking about quitting smoking, you may have a few reasons to be smoke-free. Your health may be one of them. · When you quit smoking, you lower your risks for cancer, lung disease, heart attack, stroke, blood vessel disease, and blindness from macular degeneration. · When you're smoke-free, you get sick less often, and you heal faster. You are less likely to get colds, flu, bronchitis, and pneumonia. · As a nonsmoker, you may find that your mood is better and you are less stressed. When and how will you feel healthier? Quitting has real health benefits that start from day 1 of being smoke-free. And the longer you stay smoke-free, the healthier you get and the better you feel. The first hours  · After just 20 minutes, your blood pressure and heart rate go down. That means there's less stress on your heart and blood vessels. · Within 12 hours, the level of carbon monoxide in your blood drops back to normal. That makes room for more oxygen. With more oxygen in your body, you may notice that you have more energy than when you smoked. After 2 weeks  · Your lungs start to work better. · Your risk of heart attack starts to drop.   After 1 month  · When your lungs are clear, you cough less and breathe deeper, so it's easier to be active. · Your sense of taste and smell return. That means you can enjoy food more than you have since you started smoking. Over the years  · Over the years, your risks of heart disease, heart attack, and stroke are lower. · After 10 years, your risk of dying from lung cancer is cut by about half. And your risk for many other types of cancer is lower too. How would quitting help others in your life? When you quit smoking, you improve the health of everyone who now breathes in your smoke. · Their heart, lung, and cancer risks drop, much like yours. · They are sick less. For babies and small children, living smoke-free means they're less likely to have ear infections, pneumonia, and bronchitis. · If you're a woman who is or will be pregnant someday, quitting smoking means a healthier . · Children who are close to you are less likely to become adult smokers. Where can you learn more? Go to http://www.gray.com/  Enter O319 in the search box to learn more about \"Learning About Benefits From Quitting Smoking. \"  Current as of: 2021               Content Version: 13.2  © 5625-0243 [employer]. Care instructions adapted under license by [employer] (which disclaims liability or warranty for this information). If you have questions about a medical condition or this instruction, always ask your healthcare professional. [address] any warranty or liability for your use of this information. and appropriate educational materials and side effects teaching were provided.   ___________________________________________________________________________________________________________________________________

## 2022-06-15 LAB
BACTERIA SPEC CULT: NORMAL
SERVICE CMNT-IMP: NORMAL

## 2022-07-18 ENCOUNTER — NON-APPOINTMENT (OUTPATIENT)
Age: 57
End: 2022-07-18

## 2022-07-18 RX ORDER — DICLOFENAC SODIUM AND MISOPROSTOL 75; 200 MG/1; UG/1
75-0.2 TABLET, DELAYED RELEASE ORAL DAILY
Qty: 30 | Refills: 0 | Status: ACTIVE | COMMUNITY
Start: 2022-07-18 | End: 1900-01-01

## 2022-08-09 ENCOUNTER — RESULT REVIEW (OUTPATIENT)
Age: 57
End: 2022-08-09

## 2023-04-18 ENCOUNTER — APPOINTMENT (OUTPATIENT)
Dept: PHYSICAL MEDICINE AND REHAB | Facility: CLINIC | Age: 58
End: 2023-04-18
Payer: COMMERCIAL

## 2023-04-18 DIAGNOSIS — M75.82 OTHER SHOULDER LESIONS, LEFT SHOULDER: ICD-10-CM

## 2023-04-18 DIAGNOSIS — M19.012 PRIMARY OSTEOARTHRITIS, LEFT SHOULDER: ICD-10-CM

## 2023-04-18 DIAGNOSIS — M75.42 IMPINGEMENT SYNDROME OF LEFT SHOULDER: ICD-10-CM

## 2023-04-18 PROCEDURE — 20610 DRAIN/INJ JOINT/BURSA W/O US: CPT

## 2023-04-18 NOTE — PROCEDURE
[de-identified] : Sterile Technique Injection \par 1 Syringes of 4 cc 1 % Lidocaine HCL \par                        1 cc Dexamethasone \par \par Subacromial Bursa \par \par Ice injection site PRN \par Injection tolerated well.\par \par EXAMINATION OF LEFT SHOULDER: \par \par Flexion 115 degrees\par Abduction 110 degrees\par External Rotation 55 degrees\par Internal Rotation 40 degrees\par Extension 20 degrees\par \par Palpation of the shoulder: tenderness involving the greater trochanteric bursa. \par \par Drop Arm Test -\par Anterior Apprehension Test -\par Posterior Apprehension Test -\par Impingement Test +\par Supraspinatus Test +\par \par  \par

## 2023-05-02 ENCOUNTER — NON-APPOINTMENT (OUTPATIENT)
Age: 58
End: 2023-05-02

## 2023-07-10 ENCOUNTER — OFFICE (OUTPATIENT)
Dept: URBAN - METROPOLITAN AREA CLINIC 113 | Facility: CLINIC | Age: 58
Setting detail: OPHTHALMOLOGY
End: 2023-07-10
Payer: COMMERCIAL

## 2023-07-10 ENCOUNTER — RX ONLY (RX ONLY)
Age: 58
End: 2023-07-10

## 2023-07-10 DIAGNOSIS — H11.153: ICD-10-CM

## 2023-07-10 DIAGNOSIS — H40.013: ICD-10-CM

## 2023-07-10 PROCEDURE — 92133 CPTRZD OPH DX IMG PST SGM ON: CPT | Performed by: STUDENT IN AN ORGANIZED HEALTH CARE EDUCATION/TRAINING PROGRAM

## 2023-07-10 PROCEDURE — 92004 COMPRE OPH EXAM NEW PT 1/>: CPT | Performed by: STUDENT IN AN ORGANIZED HEALTH CARE EDUCATION/TRAINING PROGRAM

## 2023-07-10 PROCEDURE — 92083 EXTENDED VISUAL FIELD XM: CPT | Performed by: STUDENT IN AN ORGANIZED HEALTH CARE EDUCATION/TRAINING PROGRAM

## 2023-07-10 ASSESSMENT — CONFRONTATIONAL VISUAL FIELD TEST (CVF)
OD_FINDINGS: FULL
OS_FINDINGS: FULL

## 2023-07-10 ASSESSMENT — KERATOMETRY
OS_K1POWER_DIOPTERS: 41.25
OS_K2POWER_DIOPTERS: 41.50
OD_K1POWER_DIOPTERS: 41.00
OD_AXISANGLE_DEGREES: 138
OD_K2POWER_DIOPTERS: 41.25
OS_AXISANGLE_DEGREES: 032

## 2023-07-10 ASSESSMENT — REFRACTION_AUTOREFRACTION
OS_CYLINDER: -0.25
OD_AXIS: 068
OS_SPHERE: +1.50
OD_CYLINDER: -0.25
OD_SPHERE: +1.50
OS_AXIS: 141

## 2023-07-10 ASSESSMENT — REFRACTION_CURRENTRX
OD_OVR_VA: 20/
OD_CYLINDER: SPH
OS_VPRISM_DIRECTION: PROGS
OS_OVR_VA: 20/
OS_CYLINDER: -0.25
OS_ADD: +2.00
OD_SPHERE: +1.50
OS_AXIS: 146
OD_VPRISM_DIRECTION: PROGS
OD_ADD: +2.00
OS_SPHERE: +1.25
OD_AXIS: 180

## 2023-07-10 ASSESSMENT — SPHEQUIV_DERIVED
OD_SPHEQUIV: 1.375
OS_SPHEQUIV: 1.375

## 2023-07-10 ASSESSMENT — VISUAL ACUITY
OS_BCVA: 20/20
OD_BCVA: 20/20

## 2023-07-10 ASSESSMENT — AXIALLENGTH_DERIVED
OS_AL: 23.8393
OD_AL: 23.9334

## 2023-07-10 ASSESSMENT — TONOMETRY
OD_IOP_MMHG: 16
OS_IOP_MMHG: 16

## 2023-10-16 ENCOUNTER — TRANSCRIBE ORDERS (OUTPATIENT)
Facility: HOSPITAL | Age: 58
End: 2023-10-16

## 2023-10-16 ENCOUNTER — HOSPITAL ENCOUNTER (OUTPATIENT)
Facility: HOSPITAL | Age: 58
Discharge: HOME | End: 2023-10-19
Payer: MEDICAID

## 2023-10-16 ENCOUNTER — HOSPITAL ENCOUNTER (OUTPATIENT)
Facility: HOSPITAL | Age: 58
Discharge: HOME | End: 2023-10-18
Payer: MEDICAID

## 2023-10-16 ENCOUNTER — HOSPITAL ENCOUNTER (OUTPATIENT)
Facility: HOSPITAL | Age: 58
Discharge: HOME | End: 2023-10-19

## 2023-10-16 DIAGNOSIS — Z01.89 DIAGNOSTIC SKIN AND SENSITIZATION TESTS: ICD-10-CM

## 2023-10-16 DIAGNOSIS — R94.2 NONSPECIFIC ABNORMAL RESULTS OF PULMONARY SYSTEM FUNCTION STUDY: ICD-10-CM

## 2023-10-16 DIAGNOSIS — Z01.89 DIAGNOSTIC SKIN AND SENSITIZATION TESTS: Primary | ICD-10-CM

## 2023-10-16 LAB
EKG ATRIAL RATE: 50 BPM
EKG DIAGNOSIS: NORMAL
EKG P AXIS: 69 DEGREES
EKG P-R INTERVAL: 142 MS
EKG Q-T INTERVAL: 426 MS
EKG QRS DURATION: 72 MS
EKG QTC CALCULATION (BAZETT): 388 MS
EKG R AXIS: 81 DEGREES
EKG T AXIS: 81 DEGREES
EKG VENTRICULAR RATE: 50 BPM
SENTARA SPECIMEN COLLECTION: NORMAL

## 2023-10-16 PROCEDURE — 71046 X-RAY EXAM CHEST 2 VIEWS: CPT

## 2023-10-16 PROCEDURE — 93005 ELECTROCARDIOGRAM TRACING: CPT

## 2024-02-14 ENCOUNTER — APPOINTMENT (OUTPATIENT)
Dept: PHYSICAL MEDICINE AND REHAB | Facility: CLINIC | Age: 59
End: 2024-02-14
Payer: COMMERCIAL

## 2024-02-14 DIAGNOSIS — M23.92 UNSPECIFIED INTERNAL DERANGEMENT OF LEFT KNEE: ICD-10-CM

## 2024-02-14 DIAGNOSIS — M17.12 UNILATERAL PRIMARY OSTEOARTHRITIS, LEFT KNEE: ICD-10-CM

## 2024-02-14 PROCEDURE — 99214 OFFICE O/P EST MOD 30 MIN: CPT

## 2024-02-14 RX ORDER — EZETIMIBE 10 MG/1
10 TABLET ORAL
Refills: 0 | Status: ACTIVE | COMMUNITY

## 2024-02-14 RX ORDER — DICLOFENAC SODIUM AND MISOPROSTOL 75; 200 MG/1; UG/1
75-0.2 TABLET, DELAYED RELEASE ORAL TWICE DAILY
Qty: 60 | Refills: 0 | Status: ACTIVE | COMMUNITY
Start: 2024-02-14 | End: 1900-01-01

## 2024-02-14 NOTE — ASSESSMENT
[FreeTextEntry1] : Request MRI of the left knee   D/C Motrin  Arthrotec 75 mg po BID with meal #60 PRN   Recheck in 2 weeks after MRI of the left knee is obtained for appropriate tx plan.

## 2024-02-14 NOTE — PHYSICAL EXAM
[Normal] : Heart rate was normal and rhythm regular, normal S1 and S2, no gallops, no murmurs and no pericardial rub [FreeTextEntry1] : EXAMINATION OF THE LEFT KNEE:   Upon inspection there is Supra patella  effusion. On palpation, medial joint line is tender  Good popliteal and distal pulse  No increased temperature Positive patella compression Anterior Draw Test was negative. Posterior Draw Test was negative  Patella Compression Test was positive. No pain with Varus and Valgus Stress  Positive Yanet Test Collateral ligaments intact   Range of motion testing was performed with the use of a goniometer.   Flexion was to 112 degrees (normal - 130) and extension was full  (normal - 0).    MMT: Flexion and extension is 5/5.

## 2024-02-14 NOTE — HISTORY OF PRESENT ILLNESS
[FreeTextEntry1] : Pt presents in my office today for left knee pain with a duration of 3 months. Pt informs me he was eval by Dr. Simon (Orth[facility]) X-ray was obtained.  Pt reports intermittent buckling and clicking while walking and reports increased left knee pain with stair climbing. Pt reports difficulty with deep knee bending and squatting.   Pt states he is taking Motrin PRN  Pain level with physical activity is 8 out 10.

## 2024-02-16 ENCOUNTER — RESULT REVIEW (OUTPATIENT)
Age: 59
End: 2024-02-16

## 2024-06-13 ENCOUNTER — APPOINTMENT (OUTPATIENT)
Dept: PHYSICAL MEDICINE AND REHAB | Facility: CLINIC | Age: 59
End: 2024-06-13

## 2024-06-13 DIAGNOSIS — B00.9 HERPESVIRAL INFECTION, UNSPECIFIED: ICD-10-CM

## 2024-06-13 PROCEDURE — 99203 OFFICE O/P NEW LOW 30 MIN: CPT

## 2024-06-13 RX ORDER — FLUTICASONE PROPIONATE 0.5 MG/G
0.05 CREAM TOPICAL TWICE DAILY
Qty: 1 | Refills: 3 | Status: ACTIVE | COMMUNITY
Start: 2024-06-13 | End: 1900-01-01

## 2024-09-25 ENCOUNTER — APPOINTMENT (OUTPATIENT)
Dept: PHYSICAL MEDICINE AND REHAB | Facility: CLINIC | Age: 59
End: 2024-09-25
Payer: COMMERCIAL

## 2024-09-25 ENCOUNTER — LABORATORY RESULT (OUTPATIENT)
Age: 59
End: 2024-09-25

## 2024-09-25 ENCOUNTER — NON-APPOINTMENT (OUTPATIENT)
Age: 59
End: 2024-09-25

## 2024-09-25 VITALS
BODY MASS INDEX: 27.17 KG/M2 | TEMPERATURE: 98.3 F | OXYGEN SATURATION: 100 % | RESPIRATION RATE: 16 BRPM | DIASTOLIC BLOOD PRESSURE: 72 MMHG | HEIGHT: 73 IN | SYSTOLIC BLOOD PRESSURE: 124 MMHG | HEART RATE: 64 BPM | WEIGHT: 205 LBS

## 2024-09-25 DIAGNOSIS — Z00.00 ENCOUNTER FOR GENERAL ADULT MEDICAL EXAMINATION W/OUT ABNORMAL FINDINGS: ICD-10-CM

## 2024-09-25 DIAGNOSIS — I10 ESSENTIAL (PRIMARY) HYPERTENSION: ICD-10-CM

## 2024-09-25 DIAGNOSIS — E55.9 VITAMIN D DEFICIENCY, UNSPECIFIED: ICD-10-CM

## 2024-09-25 DIAGNOSIS — K43.9 VENTRAL HERNIA W/OUT OBSTRUCTION OR GANGRENE: ICD-10-CM

## 2024-09-25 PROCEDURE — G0444 DEPRESSION SCREEN ANNUAL: CPT | Mod: 33,59

## 2024-09-25 PROCEDURE — 93000 ELECTROCARDIOGRAM COMPLETE: CPT | Mod: 59

## 2024-09-25 PROCEDURE — 99396 PREV VISIT EST AGE 40-64: CPT

## 2024-09-25 PROCEDURE — 81003 URINALYSIS AUTO W/O SCOPE: CPT | Mod: QW

## 2024-09-25 PROCEDURE — 36410 VNPNXR 3YR/> PHY/QHP DX/THER: CPT

## 2024-09-25 NOTE — HEALTH RISK ASSESSMENT
[0] : 2) Feeling down, depressed, or hopeless: Not at all (0) [PHQ-2 Negative - No further assessment needed] : PHQ-2 Negative - No further assessment needed [Time Spent: ___ Minutes] : I spent [unfilled] minutes performing a depression screening for this patient. [EyeExamDate] : 01/2023 [ColonoscopyDate] : 01/2023

## 2024-09-25 NOTE — PHYSICAL EXAM
[No Acute Distress] : no acute distress [Well Nourished] : well nourished [Well Developed] : well developed [Well-Appearing] : well-appearing [Normal Sclera/Conjunctiva] : normal sclera/conjunctiva [PERRL] : pupils equal round and reactive to light [EOMI] : extraocular movements intact [Normal Outer Ear/Nose] : the outer ears and nose were normal in appearance [Normal Oropharynx] : the oropharynx was normal [No JVD] : no jugular venous distention [No Lymphadenopathy] : no lymphadenopathy [Supple] : supple [Thyroid Normal, No Nodules] : the thyroid was normal and there were no nodules present [No Respiratory Distress] : no respiratory distress  [No Accessory Muscle Use] : no accessory muscle use [Clear to Auscultation] : lungs were clear to auscultation bilaterally [Normal Rate] : normal rate  [Regular Rhythm] : with a regular rhythm [Normal S1, S2] : normal S1 and S2 [No Murmur] : no murmur heard [No Carotid Bruits] : no carotid bruits [No Abdominal Bruit] : a ~M bruit was not heard ~T in the abdomen [No Varicosities] : no varicosities [Pedal Pulses Present] : the pedal pulses are present [No Edema] : there was no peripheral edema [No Palpable Aorta] : no palpable aorta [No Extremity Clubbing/Cyanosis] : no extremity clubbing/cyanosis [Soft] : abdomen soft [Non Tender] : non-tender [Non-distended] : non-distended [No Masses] : no abdominal mass palpated [No HSM] : no HSM [Normal Bowel Sounds] : normal bowel sounds [Normal Posterior Cervical Nodes] : no posterior cervical lymphadenopathy [Normal Anterior Cervical Nodes] : no anterior cervical lymphadenopathy [No CVA Tenderness] : no CVA  tenderness [No Spinal Tenderness] : no spinal tenderness [No Joint Swelling] : no joint swelling [Grossly Normal Strength/Tone] : grossly normal strength/tone [No Rash] : no rash [Coordination Grossly Intact] : coordination grossly intact [No Focal Deficits] : no focal deficits [Normal Gait] : normal gait [Deep Tendon Reflexes (DTR)] : deep tendon reflexes were 2+ and symmetric [Normal Affect] : the affect was normal [Normal Insight/Judgement] : insight and judgment were intact [de-identified] : ventral hernia

## 2024-09-25 NOTE — PLAN
[FreeTextEntry1] : Labs Drawn by Dr. Giuliano Pearce due to poor venous access.  Patient required lab testing due to conditions in their past medical history requiring periodic monitoring.  Labs were sent to [facility].  EKG - NSR - 60 , LUCRECIA - 0.204 , No acute T wave changes noted..  Discussed and reviewed medications with patient as follows;  Metoprolol- hypertension Patient to continue with present medications - all medications reconciled/reviewed during this visit and listed above.  Increase fluid intake.  RTO in 7-10 days for re-evaluation.   I, Radha Mane, attest that this documentation has been prepared under the direction and in the presence of Provider Giuliano Pearce DNP  The documentation recorded by the scribe, in my presence, accurately reflects the service I personally performed, and the decisions made by me with my edits as appropriate. Giuliano Pearce DNP

## 2024-09-25 NOTE — HISTORY OF PRESENT ILLNESS
[FreeTextEntry1] : annual wellness visit [de-identified] : Patient presents today for physical exam. His last PE was over a year ago and since that time he has not had any significant changes to his medical history. Denies any CP, SOB or diff breathing. Denies abdominal pain, blood in stool, or changes in bowel habits. No recent fever, chills, cough or cold type symptoms. Has no other complaints at this time.

## 2024-09-26 LAB
25(OH)D3 SERPL-MCNC: 43 NG/ML
ALBUMIN SERPL ELPH-MCNC: 4.6 G/DL
ALP BLD-CCNC: 84 U/L
ALT SERPL-CCNC: 29 U/L
ANION GAP SERPL CALC-SCNC: 16 MMOL/L
AST SERPL-CCNC: 19 U/L
B BURGDOR AB SER-IMP: NEGATIVE
B BURGDOR IGG+IGM SER QL: 0.17 INDEX
BASOPHILS # BLD AUTO: 0.06 K/UL
BASOPHILS NFR BLD AUTO: 1.3 %
BILIRUB SERPL-MCNC: 0.8 MG/DL
BUN SERPL-MCNC: 17 MG/DL
CALCIUM SERPL-MCNC: 9.1 MG/DL
CHLORIDE SERPL-SCNC: 101 MMOL/L
CHOLEST SERPL-MCNC: 147 MG/DL
CO2 SERPL-SCNC: 21 MMOL/L
CREAT SERPL-MCNC: 1.01 MG/DL
EGFR: 85 ML/MIN/1.73M2
EOSINOPHIL # BLD AUTO: 0.18 K/UL
EOSINOPHIL NFR BLD AUTO: 3.8 %
ESTIMATED AVERAGE GLUCOSE: 123 MG/DL
FERRITIN SERPL-MCNC: 237 NG/ML
FOLATE SERPL-MCNC: 10.5 NG/ML
GLUCOSE SERPL-MCNC: 106 MG/DL
HBA1C MFR BLD HPLC: 5.9 %
HCT VFR BLD CALC: 47.6 %
HDLC SERPL-MCNC: 55 MG/DL
HGB BLD-MCNC: 16.2 G/DL
IMM GRANULOCYTES NFR BLD AUTO: 0.4 %
IRON SATN MFR SERPL: 30 %
IRON SERPL-MCNC: 87 UG/DL
LDLC SERPL CALC-MCNC: 64 MG/DL
LYMPHOCYTES # BLD AUTO: 1.34 K/UL
LYMPHOCYTES NFR BLD AUTO: 28.3 %
MAGNESIUM SERPL-MCNC: 2.2 MG/DL
MAN DIFF?: NORMAL
MCHC RBC-ENTMCNC: 31 PG
MCHC RBC-ENTMCNC: 34 GM/DL
MCV RBC AUTO: 91.2 FL
MONOCYTES # BLD AUTO: 0.68 K/UL
MONOCYTES NFR BLD AUTO: 14.3 %
NEUTROPHILS # BLD AUTO: 2.46 K/UL
NEUTROPHILS NFR BLD AUTO: 51.9 %
NONHDLC SERPL-MCNC: 92 MG/DL
PLATELET # BLD AUTO: 197 K/UL
POTASSIUM SERPL-SCNC: 4.7 MMOL/L
PROT SERPL-MCNC: 7.2 G/DL
PSA FREE FLD-MCNC: 45 %
PSA FREE SERPL-MCNC: 0.92 NG/ML
PSA SERPL-MCNC: 2.05 NG/ML
RBC # BLD: 5.22 M/UL
RBC # FLD: 12.9 %
SODIUM SERPL-SCNC: 138 MMOL/L
T3 SERPL-MCNC: 112 NG/DL
T3RU NFR SERPL: 1 TBI
T4 FREE SERPL-MCNC: 1.3 NG/DL
TESTOST SERPL-MCNC: 451 NG/DL
TIBC SERPL-MCNC: 291 UG/DL
TRIGL SERPL-MCNC: 168 MG/DL
TSH SERPL-ACNC: 0.25 UIU/ML
UIBC SERPL-MCNC: 204 UG/DL
VIT B12 SERPL-MCNC: 573 PG/ML
WBC # FLD AUTO: 4.74 K/UL

## 2024-10-08 ENCOUNTER — APPOINTMENT (OUTPATIENT)
Dept: PHYSICAL MEDICINE AND REHAB | Facility: CLINIC | Age: 59
End: 2024-10-08
Payer: COMMERCIAL

## 2024-10-08 VITALS — HEIGHT: 73 IN | WEIGHT: 205 LBS | BODY MASS INDEX: 27.17 KG/M2

## 2024-10-08 DIAGNOSIS — R73.09 OTHER ABNORMAL GLUCOSE: ICD-10-CM

## 2024-10-08 PROCEDURE — 99213 OFFICE O/P EST LOW 20 MIN: CPT

## 2024-10-16 ENCOUNTER — APPOINTMENT (OUTPATIENT)
Dept: PHYSICAL MEDICINE AND REHAB | Facility: CLINIC | Age: 59
End: 2024-10-16
Payer: COMMERCIAL

## 2024-10-16 VITALS
HEIGHT: 73 IN | RESPIRATION RATE: 16 BRPM | BODY MASS INDEX: 27.17 KG/M2 | SYSTOLIC BLOOD PRESSURE: 118 MMHG | DIASTOLIC BLOOD PRESSURE: 70 MMHG | WEIGHT: 205 LBS | HEART RATE: 78 BPM | TEMPERATURE: 97.9 F | OXYGEN SATURATION: 97 %

## 2024-10-16 DIAGNOSIS — I10 ESSENTIAL (PRIMARY) HYPERTENSION: ICD-10-CM

## 2024-10-16 DIAGNOSIS — Z01.818 ENCOUNTER FOR OTHER PREPROCEDURAL EXAMINATION: ICD-10-CM

## 2024-10-16 DIAGNOSIS — E78.1 PURE HYPERGLYCERIDEMIA: ICD-10-CM

## 2024-10-16 DIAGNOSIS — E55.9 VITAMIN D DEFICIENCY, UNSPECIFIED: ICD-10-CM

## 2024-10-16 PROCEDURE — 93000 ELECTROCARDIOGRAM COMPLETE: CPT

## 2024-10-16 PROCEDURE — 99214 OFFICE O/P EST MOD 30 MIN: CPT

## 2025-01-13 ENCOUNTER — APPOINTMENT (OUTPATIENT)
Dept: PHYSICAL MEDICINE AND REHAB | Facility: CLINIC | Age: 60
End: 2025-01-13
Payer: COMMERCIAL

## 2025-01-13 VITALS
SYSTOLIC BLOOD PRESSURE: 124 MMHG | DIASTOLIC BLOOD PRESSURE: 80 MMHG | HEIGHT: 73 IN | BODY MASS INDEX: 27.17 KG/M2 | OXYGEN SATURATION: 98 % | RESPIRATION RATE: 16 BRPM | TEMPERATURE: 97.6 F | WEIGHT: 205 LBS | HEART RATE: 69 BPM

## 2025-01-13 DIAGNOSIS — I10 ESSENTIAL (PRIMARY) HYPERTENSION: ICD-10-CM

## 2025-01-13 DIAGNOSIS — E55.9 VITAMIN D DEFICIENCY, UNSPECIFIED: ICD-10-CM

## 2025-01-13 DIAGNOSIS — R73.09 OTHER ABNORMAL GLUCOSE: ICD-10-CM

## 2025-01-13 DIAGNOSIS — K43.9 VENTRAL HERNIA W/OUT OBSTRUCTION OR GANGRENE: ICD-10-CM

## 2025-01-13 DIAGNOSIS — E78.1 PURE HYPERGLYCERIDEMIA: ICD-10-CM

## 2025-01-13 PROCEDURE — 99213 OFFICE O/P EST LOW 20 MIN: CPT

## 2025-05-27 ENCOUNTER — APPOINTMENT (OUTPATIENT)
Dept: PHYSICAL MEDICINE AND REHAB | Facility: CLINIC | Age: 60
End: 2025-05-27

## 2025-06-03 ENCOUNTER — APPOINTMENT (OUTPATIENT)
Dept: PHYSICAL MEDICINE AND REHAB | Facility: CLINIC | Age: 60
End: 2025-06-03
Payer: COMMERCIAL

## 2025-06-03 VITALS
DIASTOLIC BLOOD PRESSURE: 70 MMHG | OXYGEN SATURATION: 98 % | SYSTOLIC BLOOD PRESSURE: 112 MMHG | RESPIRATION RATE: 16 BRPM | WEIGHT: 205 LBS | HEART RATE: 72 BPM | BODY MASS INDEX: 27.17 KG/M2 | HEIGHT: 73 IN | TEMPERATURE: 97.4 F

## 2025-06-03 DIAGNOSIS — Z01.818 ENCOUNTER FOR OTHER PREPROCEDURAL EXAMINATION: ICD-10-CM

## 2025-06-03 DIAGNOSIS — E78.1 PURE HYPERGLYCERIDEMIA: ICD-10-CM

## 2025-06-03 DIAGNOSIS — I10 ESSENTIAL (PRIMARY) HYPERTENSION: ICD-10-CM

## 2025-06-03 DIAGNOSIS — E55.9 VITAMIN D DEFICIENCY, UNSPECIFIED: ICD-10-CM

## 2025-06-03 DIAGNOSIS — M25.561 PAIN IN RIGHT KNEE: ICD-10-CM

## 2025-06-03 PROCEDURE — 36410 VNPNXR 3YR/> PHY/QHP DX/THER: CPT

## 2025-06-03 PROCEDURE — 99214 OFFICE O/P EST MOD 30 MIN: CPT

## 2025-06-03 PROCEDURE — 93010 ELECTROCARDIOGRAM REPORT: CPT | Mod: 59

## 2025-06-03 RX ORDER — KETOTIFEN FUMARATE 0.25 MG/ML
0.04 SOLUTION OPHTHALMIC
Refills: 0 | Status: ACTIVE | COMMUNITY

## 2025-06-04 LAB
ANION GAP SERPL CALC-SCNC: 12 MMOL/L
BASOPHILS # BLD AUTO: 0.09 K/UL
BASOPHILS NFR BLD AUTO: 1.5 %
BUN SERPL-MCNC: 18 MG/DL
CALCIUM SERPL-MCNC: 9.9 MG/DL
CHLORIDE SERPL-SCNC: 103 MMOL/L
CO2 SERPL-SCNC: 24 MMOL/L
CREAT SERPL-MCNC: 1.12 MG/DL
EGFRCR SERPLBLD CKD-EPI 2021: 75 ML/MIN/1.73M2
EOSINOPHIL # BLD AUTO: 0.42 K/UL
EOSINOPHIL NFR BLD AUTO: 7.1 %
GLUCOSE SERPL-MCNC: 85 MG/DL
HCT VFR BLD CALC: 47.8 %
HGB BLD-MCNC: 16 G/DL
IMM GRANULOCYTES NFR BLD AUTO: 0.3 %
LYMPHOCYTES # BLD AUTO: 1.92 K/UL
LYMPHOCYTES NFR BLD AUTO: 32.5 %
MAN DIFF?: NORMAL
MCHC RBC-ENTMCNC: 30.9 PG
MCHC RBC-ENTMCNC: 33.5 G/DL
MCV RBC AUTO: 92.5 FL
MONOCYTES # BLD AUTO: 0.74 K/UL
MONOCYTES NFR BLD AUTO: 12.5 %
NEUTROPHILS # BLD AUTO: 2.72 K/UL
NEUTROPHILS NFR BLD AUTO: 46.1 %
PLATELET # BLD AUTO: 203 K/UL
POTASSIUM SERPL-SCNC: 4.1 MMOL/L
RBC # BLD: 5.17 M/UL
RBC # FLD: 12.6 %
SODIUM SERPL-SCNC: 139 MMOL/L
WBC # FLD AUTO: 5.91 K/UL

## 2025-08-19 ENCOUNTER — APPOINTMENT (OUTPATIENT)
Dept: PHYSICAL MEDICINE AND REHAB | Facility: CLINIC | Age: 60
End: 2025-08-19